# Patient Record
Sex: MALE | Race: WHITE | Employment: UNEMPLOYED | URBAN - METROPOLITAN AREA
[De-identification: names, ages, dates, MRNs, and addresses within clinical notes are randomized per-mention and may not be internally consistent; named-entity substitution may affect disease eponyms.]

---

## 2017-10-04 ENCOUNTER — HISTORICAL (OUTPATIENT)
Dept: LAB | Facility: HOSPITAL | Age: 82
End: 2017-10-04

## 2017-10-04 LAB
BUN SERPL-MCNC: 19.7 MG/DL (ref 7–18)
CALCIUM SERPL-MCNC: 9.7 MG/DL (ref 8.5–10.1)
CHLORIDE SERPL-SCNC: 104 MMOL/L (ref 98–107)
CO2 SERPL-SCNC: 34.7 MMOL/L (ref 21–32)
CREAT SERPL-MCNC: 1 MG/DL (ref 0.6–1.3)
EST. AVERAGE GLUCOSE BLD GHB EST-MCNC: 134 MG/DL
GLUCOSE SERPL-MCNC: 96 MG/DL (ref 74–106)
HBA1C MFR BLD: 6.3 % (ref 4.5–6.2)
POTASSIUM SERPL-SCNC: 6.3 MMOL/L (ref 3.5–5.1)
SODIUM SERPL-SCNC: 142 MMOL/L (ref 136–145)

## 2017-10-06 ENCOUNTER — HISTORICAL (OUTPATIENT)
Dept: LAB | Facility: HOSPITAL | Age: 82
End: 2017-10-06

## 2017-10-06 LAB — POTASSIUM SERPL-SCNC: 4.6 MMOL/L (ref 3.5–5.1)

## 2018-04-26 ENCOUNTER — HISTORICAL (OUTPATIENT)
Dept: RADIOLOGY | Facility: HOSPITAL | Age: 83
End: 2018-04-26

## 2018-06-13 DIAGNOSIS — R06.02 SOB (SHORTNESS OF BREATH): Primary | ICD-10-CM

## 2018-09-13 ENCOUNTER — CLINICAL SUPPORT (OUTPATIENT)
Dept: PULMONOLOGY | Facility: CLINIC | Age: 83
End: 2018-09-13

## 2018-09-13 ENCOUNTER — HOSPITAL ENCOUNTER (OUTPATIENT)
Dept: RADIOLOGY | Facility: HOSPITAL | Age: 83
Discharge: HOME | End: 2018-09-13
Attending: INTERNAL MEDICINE

## 2018-09-13 ENCOUNTER — PROCEDURE VISIT (OUTPATIENT)
Dept: PULMONOLOGY | Facility: CLINIC | Age: 83
End: 2018-09-13

## 2018-09-13 VITALS
OXYGEN SATURATION: 97 % | RESPIRATION RATE: 16 BRPM | BODY MASS INDEX: 26.04 KG/M2 | WEIGHT: 186 LBS | HEIGHT: 71 IN | DIASTOLIC BLOOD PRESSURE: 76 MMHG | HEART RATE: 78 BPM | SYSTOLIC BLOOD PRESSURE: 124 MMHG

## 2018-09-13 DIAGNOSIS — R06.02 SOB (SHORTNESS OF BREATH): ICD-10-CM

## 2018-09-13 DIAGNOSIS — J96.11 CHRONIC RESPIRATORY FAILURE WITH HYPOXIA: ICD-10-CM

## 2018-09-13 DIAGNOSIS — J61 PULMONARY ASBESTOSIS: ICD-10-CM

## 2018-09-13 DIAGNOSIS — J43.1 PANLOBULAR EMPHYSEMA: Primary | ICD-10-CM

## 2018-09-13 PROBLEM — I71.40 ABDOMINAL AORTIC ANEURYSM (AAA): Status: ACTIVE | Noted: 2018-09-13

## 2018-09-13 PROBLEM — E78.2 MULTIPLE-TYPE HYPERLIPIDEMIA: Status: ACTIVE | Noted: 2018-09-13

## 2018-09-13 PROBLEM — E11.9 TYPE 2 DIABETES MELLITUS: Status: ACTIVE | Noted: 2018-09-13

## 2018-09-13 PROBLEM — I10 HYPERTENSION: Status: ACTIVE | Noted: 2018-09-13

## 2018-09-13 PROBLEM — J44.9 CHRONIC OBSTRUCTIVE PULMONARY DISEASE: Status: ACTIVE | Noted: 2018-09-13

## 2018-09-13 LAB
BRPFT: ABNORMAL
DLCO ADJ PRE: 9.67 ML/(MIN*MMHG) (ref 18.29–32.14)
DLCO PRE: 9.67 ML/(MIN*MMHG) (ref 18.29–32.14)
DLCO SINGLE BREATH LLN: 18.29
DLCO SINGLE BREATH PRE REF: 38.3 %
DLCO SINGLE BREATH REF: 25.22
DLCOC SBVA LLN: 2.38
DLCOC SBVA PRE REF: 75.9 %
DLCOC SBVA REF: 3.49
DLCOC SINGLE BREATH LLN: 18.29
DLCOC SINGLE BREATH PRE REF: 38.3 %
DLCOC SINGLE BREATH REF: 25.22
DLCOVA LLN: 2.38
DLCOVA PRE REF: 75.9 %
DLCOVA PRE: 2.65 ML/(MIN*MMHG*L) (ref 2.38–4.6)
DLCOVA REF: 3.49
DLVAADJ PRE: 2.65 ML/(MIN*MMHG*L) (ref 2.38–4.6)
ERV LLN: 0.92
ERV PRE REF: 122 %
ERV PRE: 1.12 L (ref 0.92–0.92)
ERV REF: 0.92
ERVN2 LLN: 0.92
ERVN2 REF: 0.92
FEF 25 75 CHG: 2 %
FEF 25 75 LLN: 0.72
FEF 25 75 POST REF: 18.1 %
FEF 25 75 POST: 0.36 L/S (ref 0.72–3.21)
FEF 25 75 PRE REF: 17.7 %
FEF 25 75 PRE: 0.35 L/S (ref 0.72–3.21)
FEF 25 75 REF: 1.97
FET100 CHG: 12.9 %
FET100 POST: 19 SEC
FET100 PRE: 16.82 SEC
FEV1 CHG: 3.7 %
FEV1 FVC CHG: -1.6 %
FEV1 FVC LLN: 59
FEV1 FVC POST REF: 62 %
FEV1 FVC POST: 46.08 % (ref 59.22–89.35)
FEV1 FVC PRE REF: 63 %
FEV1 FVC PRE: 46.83 % (ref 59.22–89.35)
FEV1 FVC REF: 74
FEV1 LLN: 1.96
FEV1 POST REF: 46.9 %
FEV1 POST: 1.33 L (ref 1.96–3.74)
FEV1 PRE REF: 45.2 %
FEV1 PRE: 1.29 L (ref 1.96–3.74)
FEV1 REF: 2.85
FEV6 CHG: 3.5 %
FEV6 LLN: 2.83
FEV6 POST REF: 62.4 %
FEV6 POST: 2.34 L (ref 2.83–4.67)
FEV6 PRE REF: 60.2 %
FEV6 PRE: 2.26 L (ref 2.83–4.67)
FEV6 REF: 3.75
FRCN2 LLN: 2.85
FRCN2 REF: 3.84
FRCPL PRE: 3.5 L
FRCPLETH LLN: 2.85
FRCPLETH PREREF: 91.2 %
FRCPLETH REF: 3.84
FVC CHG: 5.4 %
FVC LLN: 2.78
FVC POST REF: 74.7 %
FVC POST: 2.9 L (ref 2.78–4.98)
FVC PRE REF: 70.9 %
FVC PRE: 2.75 L (ref 2.78–4.98)
FVC REF: 3.88
IVC PRE: 2.01 L (ref 2.78–4.98)
IVC SINGLE BREATH LLN: 2.78
IVC SINGLE BREATH PRE REF: 51.9 %
IVC SINGLE BREATH REF: 3.88
MVV LLN: 96
MVV PRE REF: 37.1 %
MVV PRE: 42 L/MIN (ref 96.19–130.13)
MVV REF: 113
PEF CHG: -18.8 %
PEF LLN: 4.74
PEF POST REF: 44 %
PEF POST: 3.12 L/S (ref 4.74–9.43)
PEF PRE REF: 54.2 %
PEF PRE: 3.84 L/S (ref 4.74–9.43)
PEF REF: 7.09
RAW LLN: 3.06
RAW PRE REF: 388 %
RAW PRE: 11.87 CMH2O*S/L (ref 3.06–3.06)
RAW REF: 3.06
RV LLN: 2.24
RV PRE REF: 71.3 %
RV PRE: 2.08 L (ref 2.24–3.59)
RV REF: 2.92
RVN2 LLN: 2.24
RVN2 REF: 2.92
RVN2TLCN2 LLN: 37
RVN2TLCN2 REF: 46
RVTLC LLN: 37
RVTLC PRE REF: 93.8 %
RVTLC PRE: 43.09 % (ref 36.96–54.92)
RVTLC REF: 46
TLC LLN: 6.07
TLC PRE REF: 66.9 %
TLC PRE: 4.83 L (ref 6.07–8.37)
TLC REF: 7.22
TLCN2 LLN: 6.07
TLCN2 REF: 7.22
VA PRE: 3.64 L (ref 7.07–7.07)
VA SINGLE BREATH LLN: 7.07
VA SINGLE BREATH PRE REF: 51.5 %
VA SINGLE BREATH REF: 7.07
VC LLN: 2.78
VC PRE REF: 70.9 %
VC PRE: 2.75 L (ref 2.78–4.98)
VC REF: 3.88
VCMAXN2 LLN: 2.78
VCMAXN2 REF: 3.88
VTGRAWPRE: 3.54 L

## 2018-09-13 PROCEDURE — 99199 UNLISTED SPECIAL SVC PX/RPRT: CPT | Mod: ,,, | Performed by: INTERNAL MEDICINE

## 2018-09-13 PROCEDURE — 71048 X-RAY EXAM CHEST 4+ VIEWS: CPT | Mod: 26,,, | Performed by: RADIOLOGY

## 2018-09-13 PROCEDURE — 94060 EVALUATION OF WHEEZING: CPT | Mod: ,,, | Performed by: INTERNAL MEDICINE

## 2018-09-13 PROCEDURE — 94726 PLETHYSMOGRAPHY LUNG VOLUMES: CPT | Mod: ,,, | Performed by: INTERNAL MEDICINE

## 2018-09-13 PROCEDURE — 71048 X-RAY EXAM CHEST 4+ VIEWS: CPT | Mod: TC,PO

## 2018-09-13 PROCEDURE — 99455 WORK RELATED DISABILITY EXAM: CPT | Mod: 25,,, | Performed by: INTERNAL MEDICINE

## 2018-09-13 PROCEDURE — 94729 DIFFUSING CAPACITY: CPT | Mod: ,,, | Performed by: INTERNAL MEDICINE

## 2018-09-13 RX ORDER — ATORVASTATIN CALCIUM 80 MG/1
TABLET, FILM COATED ORAL
COMMUNITY
Start: 2018-07-16

## 2018-09-13 RX ORDER — METFORMIN HYDROCHLORIDE 500 MG/1
TABLET ORAL
Status: ON HOLD | COMMUNITY
Start: 2018-07-16 | End: 2023-03-14 | Stop reason: HOSPADM

## 2018-09-13 RX ORDER — CARVEDILOL 3.12 MG/1
TABLET ORAL
COMMUNITY
Start: 2018-07-16

## 2018-09-13 RX ORDER — AMLODIPINE BESYLATE 5 MG/1
TABLET ORAL
COMMUNITY
Start: 2018-07-16

## 2018-09-13 RX ORDER — PANTOPRAZOLE SODIUM 40 MG/1
TABLET, DELAYED RELEASE ORAL
Status: ON HOLD | COMMUNITY
Start: 2018-08-20 | End: 2023-03-14 | Stop reason: HOSPADM

## 2018-09-13 RX ORDER — PANTOPRAZOLE SODIUM 40 MG/1
20 TABLET, DELAYED RELEASE ORAL DAILY
COMMUNITY
Start: 2018-07-16

## 2018-09-13 NOTE — PROGRESS NOTES
HISTORY OF PRESENT ILLNESS:  This is an 83-year-old gentleman who was referred   to my office for further evaluation of asbestos exposure.    He has complaints of shortness of breath and dyspnea, which has been going on   for a number of years.  He indicates that this has become worse over the past   two to three years and he has required oxygen on a regular basis.  He has a   history of numerous hospitalizations for respiratory failure, bronchitis and   pneumonia.  The patient's daughter states that he has been in the hospital   during the fall, three times in the past three years.  Presently, he has   complaints of occasional cough.  He has no active sputum production today.  He   has chronic shortness of breath when performing daily activities.  He has no   complaints of chest pain.  He has a significant history of cigarette smoking in   the past, having smoked half to one pack of cigarettes a day from 1952 to .    Over this period of time, he has accumulated a 60+ pack year history of   cigarette smoking.  He denies any significant history of heart disease.  He has   been told he has emphysema.  He has no prior history of tuberculosis.    PAST MEDICAL HISTORY:  Prostate cancer.    FAMILY HISTORY:  His mother  of old age.  His father also had a prostate   cancer.    His occupational history was reviewed.  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 He worked on boilers and engine rooms as well as   machines on deck - dredging equipment and friction drag lines.  He served as a helper to mechanics, as a  and later as a captain.   He retired in .    Over the many years of employment, he worked in the areas where asbestos was   present.  He worked on engines that contained asbestos dust.  He  worked in areas   were asbestos friction lines were present.  He reports he worked on a ship   called the [place] dredger, which had a significant amount of asbestos.  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 He states that the engine room and friction lines   on the deck that contained asbestos and during repairs asbestos fibers were generated.   He worked on this vessel for a number of years.  He reports significant exposure to asbestos   dust and fibers while working inside the holds, performing repairs on the deck and in engine rooms of ships.    He reports no history of sandblasting or welding.  Overall he has a significant history of asbestos exposure in the past.    PHYSICAL EXAMINATION:  GENERAL:  Pleasant, chronically ill-appearing gentleman who is in a wheelchair.    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  VITAL SIGNS:  His height is 70-1/2 inches, his weight is 186 pounds.  His pulse   is 60 and regular, respiratory rate is 18, his oxygen saturation is 97% on 2   liters of oxygen, his blood pressure is 124/76.  HEENT:  His left conjunctiva is injected.  He is wearing supplemental oxygen.    Pupils are equal and reactive to light.  Oral cavity is grossly normal.  NECK:  No lymphadenopathy.  CHEST:  Decreased breath sounds.  Scattered rales are heard throughout both lung   fields.  Wheezing is heard anteriorly.  HEART:  Distant heart sounds.  Regular rhythm.  ABDOMEN:  Soft.  Liver and spleen not enlarged.  EXTREMITIES:  No cyanosis or clubbing.  There is trace edema present.  The right   arm has decreased range of motion due to his shoulder injury.    REVIEW OF CHEST RADIOGRAPHS:  The patient's chest x-ray from the [facility] dated   09/13/2018 is personally reviewed.  This film reveals bilateral coarsening of interstitial   markings in the mid and lower lung fields. There is a small pleural plaque seen in profile on the mid left lateral chest wall.  An area of atelectasis noted  on the left mid chest.  These are bilateral apical pleural thickening, slightly   greater on the right.  Emphysematous changes are noted.    Radiologist Report:    Radiology Result      Name:   :  Patient MRN:   Prasanth Garcia 1935 [id number]   Account Number: Room & Bed Accession Number:   [id number]   [id number]   Authorizing Physician: Patient Class: Diagnosis:   Ryan Newton OP- Outpatient Diagnostic Testing SOB (shortness of breath) [R06.02 (ICD-10-CM)]   Procedure:  Exam Date: Reason for Exam:   X-Ray Chest 4 Or More View 2018 None Specified             RESULTS:     EXAMINATION:  XR CHEST 4 OR MORE VIEW     CLINICAL HISTORY:  Shortness of breath     COMPARISON:  None     FINDINGS:  COPD type changes noted with mild coarsening of the interstitial markings.    Linear areas of scarring and/or subsegmental atelectasis noted within the   left mid and bilateral lower lung fields.  Heart size within normal limits   and pulmonary vasculature normal allowing for rotation.  No effusions.  No   consolidation.  Osteopenia and spondylosis.  Bilateral apical pleural   thickening, greater on the right.  Prominent degenerative change noted the   AC joints and there is extensive degenerative change at the right   glenohumeral joint.     IMPRESSION:      Mild COPD changes without focal consolidation.     Scattered areas of linear scarring and/or subsegmental atelectasis   bilaterally.     Additional findings as above.        Electronically signed by:     James Mccall MD  Date:                                    2018  Time:                                   16:05                    Signed By:  James Mccall III, MD on 2018  4:05 PM          REVIEW OF PULMONARY FUNCTION STUDIES:  Pulmonary function studies are reviewed and demonstrate a reduced total lung   capacity of 4.83%, 66.9% of predicted.  His FEV1 is reduced at 1.29 liters, 45%   of predicted.  There is no improvement in airflow with  bronchodilatation.  His   diffusion capacity is severely reduced at 38.3 mL/min/mmHg.    Overall, this is a mixed obstructive and restrictive defect.  His diffusion   capacity is severely reduced.    REVIEW OF MEDICAL RECORDS:  Old medical records are reviewed.  A chest x-ray report from 07/19/2017 by Dr. Kenji Malik describes prominent interstitial markings throughout.    A report of a CT scan of the chest is reviewed, dated 05/15/2017 which   demonstrated an area of subpleural consolidation in the left lung base, changes   of emphysema and bronchial wall thickening in the lower lungs, nonspecific solid   5 to 6-mm pulmonary nodules were also reported.    ASSESSMENT:  It is my opinion Mr. Prasanth Garcia has pulmonary asbestosis as   manifested by radiographic evidence of interstitial fibrosis, rales on physical   examination, restrictive defect in his lung function and a significant history   of prior asbestos exposure.    He has severe impairment of his lung function and is oxygen dependent.    Additionally, he has chronic obstructive pulmonary disease and is on a regular   treatment program by a pulmonologist in the [facility].    He remains at increased risk for development of lung cancer, mesothelioma as   well as other malignancies due to his prior asbestos exposure.  He should obtain   yearly chest x-rays and pulmonary function studies to monitor his   asbestos-related disease.    Thank you for allowing me to evaluate this individual.        Ryan Newton MD                                          Patient Active Problem List   Diagnosis    Chronic obstructive pulmonary disease    Abdominal aortic aneurysm (AAA)    Hypertension    Multiple-type hyperlipidemia    Type 2 diabetes mellitus    Pulmonary asbestosis    Chronic respiratory failure with hypoxia

## 2018-09-13 NOTE — LETTER
September 14, 2018      [employer]  [address]  [address]  [place] [address]         [facility]  [address]  [place] [address]  Phone: [phone]  Fax: [phone]   Patient: Prasanth Garcia   MR Number: [id number]   YOB: 1935   Date of Visit: 9/13/2018       Dear Mr. Russellciau    Thank you for referring Prasanth Garcia to me for evaluation. Attached you will find relevant portions of my assessment and plan of care.    If you have questions, please do not hesitate to call me.    Sincerely,          Ryan Newton MD      CC  No Recipients    Enclosure

## 2018-09-14 PROBLEM — J61 PULMONARY ASBESTOSIS: Status: ACTIVE | Noted: 2018-09-14

## 2018-09-14 PROBLEM — J96.11 CHRONIC RESPIRATORY FAILURE WITH HYPOXIA: Status: ACTIVE | Noted: 2018-09-14

## 2018-10-17 ENCOUNTER — HOSPITAL ENCOUNTER (OUTPATIENT)
Dept: MEDSURG UNIT | Facility: HOSPITAL | Age: 83
End: 2018-10-19
Attending: HOSPITALIST | Admitting: INTERNAL MEDICINE

## 2018-10-17 LAB
ABS NEUT (OLG): 4.61 X10(3)/MCL (ref 2.1–9.2)
ALBUMIN SERPL-MCNC: 2.1 GM/DL (ref 3.4–5)
ALBUMIN SERPL-MCNC: 3 GM/DL (ref 3.4–5)
ALBUMIN/GLOB SERPL: 0.9 {RATIO}
ALBUMIN/GLOB SERPL: 0.9 {RATIO}
ALP SERPL-CCNC: 55 UNIT/L (ref 50–136)
ALP SERPL-CCNC: 80 UNIT/L (ref 50–136)
ALT SERPL-CCNC: 12 UNIT/L (ref 12–78)
ALT SERPL-CCNC: 14 UNIT/L (ref 12–78)
AST SERPL-CCNC: 13 UNIT/L (ref 15–37)
AST SERPL-CCNC: 19 UNIT/L (ref 15–37)
BASOPHILS # BLD AUTO: 0 X10(3)/MCL (ref 0–0.2)
BASOPHILS NFR BLD AUTO: 0 %
BILIRUB SERPL-MCNC: 0.4 MG/DL (ref 0.2–1)
BILIRUB SERPL-MCNC: 0.4 MG/DL (ref 0.2–1)
BILIRUBIN DIRECT+TOT PNL SERPL-MCNC: 0.1 MG/DL (ref 0–0.2)
BILIRUBIN DIRECT+TOT PNL SERPL-MCNC: 0.2 MG/DL (ref 0–0.2)
BILIRUBIN DIRECT+TOT PNL SERPL-MCNC: 0.2 MG/DL (ref 0–0.8)
BILIRUBIN DIRECT+TOT PNL SERPL-MCNC: 0.3 MG/DL (ref 0–0.8)
BUN SERPL-MCNC: 11 MG/DL (ref 7–18)
BUN SERPL-MCNC: 14 MG/DL (ref 7–18)
CALCIUM SERPL-MCNC: 6.1 MG/DL (ref 8.5–10.1)
CALCIUM SERPL-MCNC: 8.3 MG/DL (ref 8.5–10.1)
CHLORIDE SERPL-SCNC: 107 MMOL/L (ref 98–107)
CHLORIDE SERPL-SCNC: 118 MMOL/L (ref 98–107)
CO2 SERPL-SCNC: 25 MMOL/L (ref 21–32)
CO2 SERPL-SCNC: 31 MMOL/L (ref 21–32)
COLOR STL: NORMAL
CONSISTENCY STL: NORMAL
CREAT SERPL-MCNC: 0.77 MG/DL (ref 0.7–1.3)
CREAT SERPL-MCNC: 1.21 MG/DL (ref 0.7–1.3)
EOSINOPHIL # BLD AUTO: 0.2 X10(3)/MCL (ref 0–0.9)
EOSINOPHIL NFR BLD AUTO: 4 %
ERYTHROCYTE [DISTWIDTH] IN BLOOD BY AUTOMATED COUNT: 12.7 % (ref 11.5–17)
GLOBULIN SER-MCNC: 2.4 GM/DL (ref 2.4–3.5)
GLOBULIN SER-MCNC: 3.4 GM/DL (ref 2.4–3.5)
GLUCOSE SERPL-MCNC: 102 MG/DL (ref 74–106)
GLUCOSE SERPL-MCNC: 103 MG/DL (ref 74–106)
HCT VFR BLD AUTO: 28 % (ref 42–52)
HGB BLD-MCNC: 8.5 GM/DL (ref 14–18)
LACTATE SERPL-SCNC: 2 MMOL/L (ref 0.4–2)
LACTATE SERPL-SCNC: 2.3 MMOL/L (ref 0.4–2)
LYMPHOCYTES # BLD AUTO: 1.5 X10(3)/MCL (ref 0.6–4.6)
LYMPHOCYTES NFR BLD AUTO: 21 %
MCH RBC QN AUTO: 30.2 PG (ref 27–31)
MCHC RBC AUTO-ENTMCNC: 30.4 GM/DL (ref 33–36)
MCV RBC AUTO: 99.6 FL (ref 80–94)
MONOCYTES # BLD AUTO: 0.6 X10(3)/MCL (ref 0.1–1.3)
MONOCYTES NFR BLD AUTO: 8 %
NEUTROPHILS # BLD AUTO: 4.61 X10(3)/MCL (ref 2.1–9.2)
NEUTROPHILS NFR BLD AUTO: 66 %
PLATELET # BLD AUTO: 150 X10(3)/MCL (ref 130–400)
PMV BLD AUTO: 11.4 FL (ref 9.4–12.4)
POTASSIUM SERPL-SCNC: 3.2 MMOL/L (ref 3.5–5.1)
POTASSIUM SERPL-SCNC: 4.3 MMOL/L (ref 3.5–5.1)
PROT SERPL-MCNC: 4.5 GM/DL (ref 6.4–8.2)
PROT SERPL-MCNC: 6.4 GM/DL (ref 6.4–8.2)
RBC # BLD AUTO: 2.81 X10(6)/MCL (ref 4.7–6.1)
SODIUM SERPL-SCNC: 143 MMOL/L (ref 136–145)
SODIUM SERPL-SCNC: 150 MMOL/L (ref 136–145)
WBC # SPEC AUTO: 7 X10(3)/MCL (ref 4.5–11.5)

## 2018-10-18 LAB
BUN SERPL-MCNC: 13 MG/DL (ref 7–18)
CALCIUM SERPL-MCNC: 8.7 MG/DL (ref 8.5–10.1)
CHLORIDE SERPL-SCNC: 107 MMOL/L (ref 98–107)
CO2 SERPL-SCNC: 32 MMOL/L (ref 21–32)
COLOR STL: NORMAL
CONSISTENCY STL: NORMAL
CREAT SERPL-MCNC: 1.15 MG/DL (ref 0.7–1.3)
CREAT/UREA NIT SERPL: 11.3
ERYTHROCYTE [DISTWIDTH] IN BLOOD BY AUTOMATED COUNT: 12.7 % (ref 11.5–17)
GLUCOSE SERPL-MCNC: 170 MG/DL (ref 74–106)
HCT VFR BLD AUTO: 33.1 % (ref 42–52)
HEMOCCULT SP2 STL QL: NEGATIVE
HGB BLD-MCNC: 10.2 GM/DL (ref 14–18)
MCH RBC QN AUTO: 30.3 PG (ref 27–31)
MCHC RBC AUTO-ENTMCNC: 30.8 GM/DL (ref 33–36)
MCV RBC AUTO: 98.2 FL (ref 80–94)
PLATELET # BLD AUTO: 195 X10(3)/MCL (ref 130–400)
PMV BLD AUTO: 11.3 FL (ref 9.4–12.4)
POTASSIUM SERPL-SCNC: 4.4 MMOL/L (ref 3.5–5.1)
RBC # BLD AUTO: 3.37 X10(6)/MCL (ref 4.7–6.1)
SODIUM SERPL-SCNC: 144 MMOL/L (ref 136–145)
WBC # SPEC AUTO: 6.4 X10(3)/MCL (ref 4.5–11.5)

## 2018-10-19 LAB — FINAL CULTURE: NORMAL

## 2018-10-23 LAB
FINAL CULTURE: NORMAL
FINAL CULTURE: NORMAL

## 2019-01-20 LAB — C DIFF INTERP: NEGATIVE

## 2019-01-21 ENCOUNTER — HISTORICAL (OUTPATIENT)
Dept: ADMINISTRATIVE | Facility: HOSPITAL | Age: 84
End: 2019-01-21

## 2019-01-21 LAB
ABS NEUT (OLG): 4.89 X10(3)/MCL (ref 2.1–9.2)
ALBUMIN SERPL-MCNC: 3.3 GM/DL (ref 3.4–5)
ALBUMIN/GLOB SERPL: 1.1 {RATIO}
ALP SERPL-CCNC: 83 UNIT/L (ref 50–136)
ALT SERPL-CCNC: 16 UNIT/L (ref 12–78)
AST SERPL-CCNC: 16 UNIT/L (ref 15–37)
BASOPHILS # BLD AUTO: 0 X10(3)/MCL (ref 0–0.2)
BASOPHILS NFR BLD AUTO: 0 %
BILIRUB SERPL-MCNC: 0.8 MG/DL (ref 0.2–1)
BILIRUBIN DIRECT+TOT PNL SERPL-MCNC: 0.2 MG/DL (ref 0–0.2)
BILIRUBIN DIRECT+TOT PNL SERPL-MCNC: 0.6 MG/DL (ref 0–0.8)
BUN SERPL-MCNC: 14 MG/DL (ref 7–18)
CALCIUM SERPL-MCNC: 8.7 MG/DL (ref 8.5–10.1)
CHLORIDE SERPL-SCNC: 102 MMOL/L (ref 98–107)
CO2 SERPL-SCNC: 33 MMOL/L (ref 21–32)
CREAT SERPL-MCNC: 0.98 MG/DL (ref 0.7–1.3)
EOSINOPHIL # BLD AUTO: 0.4 X10(3)/MCL (ref 0–0.9)
EOSINOPHIL NFR BLD AUTO: 6 %
ERYTHROCYTE [DISTWIDTH] IN BLOOD BY AUTOMATED COUNT: 13.4 % (ref 11.5–17)
GLOBULIN SER-MCNC: 3.1 GM/DL (ref 2.4–3.5)
GLUCOSE SERPL-MCNC: 104 MG/DL (ref 74–106)
HCT VFR BLD AUTO: 35.6 % (ref 42–52)
HGB BLD-MCNC: 10.7 GM/DL (ref 14–18)
LYMPHOCYTES # BLD AUTO: 2.1 X10(3)/MCL (ref 0.6–4.6)
LYMPHOCYTES NFR BLD AUTO: 26 %
MCH RBC QN AUTO: 29.9 PG (ref 27–31)
MCHC RBC AUTO-ENTMCNC: 30.1 GM/DL (ref 33–36)
MCV RBC AUTO: 99.4 FL (ref 80–94)
MONOCYTES # BLD AUTO: 0.6 X10(3)/MCL (ref 0.1–1.3)
MONOCYTES NFR BLD AUTO: 8 %
NEUTROPHILS # BLD AUTO: 4.89 X10(3)/MCL (ref 2.1–9.2)
NEUTROPHILS NFR BLD AUTO: 60 %
PLATELET # BLD AUTO: 229 X10(3)/MCL (ref 130–400)
PMV BLD AUTO: 11.7 FL (ref 9.4–12.4)
POTASSIUM SERPL-SCNC: 4.5 MMOL/L (ref 3.5–5.1)
PROT SERPL-MCNC: 6.4 GM/DL (ref 6.4–8.2)
RBC # BLD AUTO: 3.58 X10(6)/MCL (ref 4.7–6.1)
SODIUM SERPL-SCNC: 143 MMOL/L (ref 136–145)
TSH SERPL-ACNC: 2.32 MIU/L (ref 0.36–3.74)
WBC # SPEC AUTO: 8.2 X10(3)/MCL (ref 4.5–11.5)

## 2019-01-24 LAB — FINAL CULTURE: NORMAL

## 2019-02-05 ENCOUNTER — HISTORICAL (OUTPATIENT)
Dept: LAB | Facility: HOSPITAL | Age: 84
End: 2019-02-05

## 2019-02-05 LAB
APPEARANCE, UA: CLEAR
BACTERIA SPEC CULT: ABNORMAL
BILIRUB UR QL STRIP: ABNORMAL
COLOR UR: YELLOW
CREAT UR-MCNC: 384 MG/DL (ref 30–125)
EST. AVERAGE GLUCOSE BLD GHB EST-MCNC: 123 MG/DL
GLUCOSE (UA): NEGATIVE
HBA1C MFR BLD: 5.9 % (ref 4.5–6.2)
HGB UR QL STRIP: NEGATIVE
HYALINE CASTS #/AREA URNS LPF: ABNORMAL /[LPF]
KETONES UR QL STRIP: ABNORMAL
LEUKOCYTE ESTERASE UR QL STRIP: NEGATIVE
MICROALBUMIN UR-MCNC: >10 MG/DL (ref 0–3)
MICROALBUMIN/CREAT RATIO PNL UR: >26 MG/GM CR (ref 0–30)
MUCOUS THREADS URNS QL MICRO: ABNORMAL
NITRITE UR QL STRIP: NEGATIVE
PH UR STRIP: 5 [PH] (ref 5–9)
PROT UR QL STRIP: 100
RBC #/AREA URNS HPF: ABNORMAL /HPF
SP GR UR STRIP: 1.02 (ref 1–1.03)
SQUAMOUS EPITHELIAL, UA: ABNORMAL
UROBILINOGEN UR STRIP-ACNC: 0.2
WBC #/AREA URNS HPF: ABNORMAL /HPF

## 2019-02-08 LAB — FINAL CULTURE: NO GROWTH

## 2019-05-09 ENCOUNTER — HISTORICAL (OUTPATIENT)
Dept: ADMINISTRATIVE | Facility: HOSPITAL | Age: 84
End: 2019-05-09

## 2019-05-09 LAB
ALBUMIN SERPL-MCNC: 3.9 G/DL (ref 3.5–4.7)
ALBUMIN/GLOB SERPL: 1.8 {RATIO} (ref 1.2–2.2)
ALP SERPL-CCNC: 97 IU/L (ref 39–117)
ALT SERPL-CCNC: 17 IU/L (ref 0–44)
AST SERPL-CCNC: 14 IU/L (ref 0–40)
BASOPHILS # BLD AUTO: 0 X10E3/UL (ref 0–0.2)
BASOPHILS NFR BLD AUTO: 0 %
BILIRUB SERPL-MCNC: 0.3 MG/DL (ref 0–1.2)
BUN SERPL-MCNC: 23 MG/DL (ref 8–27)
CALCIUM SERPL-MCNC: 9.5 MG/DL (ref 8.6–10.2)
CHLORIDE SERPL-SCNC: 105 MMOL/L (ref 96–106)
CO2 SERPL-SCNC: 29 MMOL/L (ref 20–29)
CREAT SERPL-MCNC: 0.85 MG/DL (ref 0.76–1.27)
CREAT/UREA NIT SERPL: 27 (ref 10–24)
EOSINOPHIL # BLD AUTO: 0.2 X10E3/UL (ref 0–0.4)
EOSINOPHIL NFR BLD AUTO: 3 %
ERYTHROCYTE [DISTWIDTH] IN BLOOD BY AUTOMATED COUNT: 13.7 % (ref 12.3–15.4)
GLOBULIN SER-MCNC: 2.2 G/DL (ref 1.5–4.5)
GLUCOSE SERPL-MCNC: 98 MG/DL (ref 65–99)
HBA1C MFR BLD: 6.3 % (ref 4.8–5.6)
HCT VFR BLD AUTO: 38.1 % (ref 37.5–51)
HGB BLD-MCNC: 11.8 G/DL (ref 13–17.7)
LYMPHOCYTES # BLD AUTO: 2.1 X10E3/UL (ref 0.7–3.1)
LYMPHOCYTES NFR BLD AUTO: 23 %
MCH RBC QN AUTO: 30.4 PG (ref 26.6–33)
MCHC RBC AUTO-ENTMCNC: 31 G/DL (ref 31.5–35.7)
MCV RBC AUTO: 98 FL (ref 79–97)
MONOCYTES # BLD AUTO: 0.8 X10E3/UL (ref 0.1–0.9)
MONOCYTES NFR BLD AUTO: 9 %
NEUTROPHILS # BLD AUTO: 5.9 X10E3/UL (ref 1.4–7)
NEUTROPHILS NFR BLD AUTO: 65 %
PLATELET # BLD AUTO: 225 X10E3/UL (ref 150–379)
POTASSIUM SERPL-SCNC: 4.8 MMOL/L (ref 3.5–5.2)
PROT SERPL-MCNC: 6.1 G/DL (ref 6–8.5)
RBC # BLD AUTO: 3.88 X10(6)/MCL (ref 4.14–5.8)
SODIUM SERPL-SCNC: 148 MMOL/L (ref 134–144)
WBC # SPEC AUTO: 9.1 X10E3/UL (ref 3.4–10.8)

## 2019-10-14 ENCOUNTER — HISTORICAL (OUTPATIENT)
Dept: RESPIRATORY THERAPY | Facility: HOSPITAL | Age: 84
End: 2019-10-14

## 2020-06-02 ENCOUNTER — HISTORICAL (OUTPATIENT)
Dept: ADMINISTRATIVE | Facility: HOSPITAL | Age: 85
End: 2020-06-02

## 2020-08-12 ENCOUNTER — HISTORICAL (OUTPATIENT)
Dept: ADMINISTRATIVE | Facility: HOSPITAL | Age: 85
End: 2020-08-12

## 2020-08-12 LAB — PREALB SERPL-MCNC: 15 MG/DL (ref 16–42)

## 2020-08-25 ENCOUNTER — HISTORICAL (OUTPATIENT)
Dept: ADMINISTRATIVE | Facility: HOSPITAL | Age: 85
End: 2020-08-25

## 2020-08-25 LAB
ABS NEUT (OLG): 2.11 X10(3)/MCL (ref 2.1–9.2)
ALBUMIN SERPL-MCNC: 3.4 GM/DL (ref 3.4–4.8)
ALBUMIN/GLOB SERPL: 1.3 RATIO (ref 1.1–2)
ALP SERPL-CCNC: 89 UNIT/L (ref 40–150)
ALT SERPL-CCNC: 18 UNIT/L (ref 0–55)
APPEARANCE, UA: ABNORMAL
AST SERPL-CCNC: 29 UNIT/L (ref 5–34)
BACTERIA SPEC CULT: ABNORMAL /HPF
BILIRUB SERPL-MCNC: 0.7 MG/DL
BILIRUB UR QL STRIP: NEGATIVE
BILIRUBIN DIRECT+TOT PNL SERPL-MCNC: 0.3 MG/DL (ref 0–0.5)
BILIRUBIN DIRECT+TOT PNL SERPL-MCNC: 0.4 MG/DL (ref 0–0.8)
BUN SERPL-MCNC: 24.7 MG/DL (ref 8.4–25.7)
CALCIUM SERPL-MCNC: 8.2 MG/DL (ref 8.8–10)
CHLORIDE SERPL-SCNC: 98 MMOL/L (ref 98–107)
CO2 SERPL-SCNC: 30 MMOL/L (ref 23–31)
COLOR UR: YELLOW
CREAT SERPL-MCNC: 1.22 MG/DL (ref 0.73–1.18)
EOSINOPHIL # BLD AUTO: 0 X10(3)/MCL (ref 0–0.9)
EOSINOPHIL NFR BLD AUTO: 1 %
ERYTHROCYTE [DISTWIDTH] IN BLOOD BY AUTOMATED COUNT: 16.8 % (ref 11.5–17)
GLOBULIN SER-MCNC: 2.6 GM/DL (ref 2.4–3.5)
GLUCOSE (UA): NEGATIVE
GLUCOSE SERPL-MCNC: 144 MG/DL (ref 82–115)
HCT VFR BLD AUTO: 35.8 % (ref 42–52)
HGB BLD-MCNC: 10.6 GM/DL (ref 14–18)
HGB UR QL STRIP: ABNORMAL
KETONES UR QL STRIP: NEGATIVE
LEUKOCYTE ESTERASE UR QL STRIP: ABNORMAL
LYMPHOCYTES # BLD AUTO: 1.3 X10(3)/MCL (ref 0.6–4.6)
LYMPHOCYTES NFR BLD AUTO: 32 %
MCH RBC QN AUTO: 27.4 PG (ref 27–31)
MCHC RBC AUTO-ENTMCNC: 29.6 GM/DL (ref 33–36)
MCV RBC AUTO: 92.5 FL (ref 80–94)
MONOCYTES # BLD AUTO: 0.5 X10(3)/MCL (ref 0.1–1.3)
MONOCYTES NFR BLD AUTO: 14 %
NEUTROPHILS # BLD AUTO: 2.11 X10(3)/MCL (ref 2.1–9.2)
NEUTROPHILS NFR BLD AUTO: 53 %
NITRITE UR QL STRIP: NEGATIVE
PH UR STRIP: 6.5 [PH] (ref 5–9)
PLATELET # BLD AUTO: 182 X10(3)/MCL (ref 130–400)
PMV BLD AUTO: 11.2 FL (ref 9.4–12.4)
POTASSIUM SERPL-SCNC: 4.1 MMOL/L (ref 3.5–5.1)
PROT SERPL-MCNC: 6 GM/DL (ref 5.8–7.6)
PROT UR QL STRIP: NEGATIVE
RBC # BLD AUTO: 3.87 X10(6)/MCL (ref 4.7–6.1)
RBC #/AREA URNS HPF: 9 /HPF (ref 0–2)
SODIUM SERPL-SCNC: 140 MMOL/L (ref 136–145)
SP GR UR STRIP: 1.01 (ref 1–1.03)
SQUAMOUS EPITHELIAL, UA: ABNORMAL
UROBILINOGEN UR STRIP-ACNC: 0.2
WBC # SPEC AUTO: 4 X10(3)/MCL (ref 4.5–11.5)
WBC #/AREA URNS HPF: 24 /HPF (ref 0–3)

## 2020-09-21 ENCOUNTER — HISTORICAL (OUTPATIENT)
Dept: ADMINISTRATIVE | Facility: HOSPITAL | Age: 85
End: 2020-09-21

## 2020-09-21 LAB
CREAT UR-MCNC: 91.9 MG/DL (ref 58–161)
MICROALBUMIN UR-MCNC: 6.4 UG/ML
MICROALBUMIN/CREAT RATIO PNL UR: 7 MG/GM CR (ref 0–30)

## 2020-09-25 ENCOUNTER — HISTORICAL (OUTPATIENT)
Dept: ADMINISTRATIVE | Facility: HOSPITAL | Age: 85
End: 2020-09-25

## 2020-09-25 LAB
ABS NEUT (OLG): 3.52 X10(3)/MCL (ref 2.1–9.2)
ALBUMIN SERPL-MCNC: 3.3 GM/DL (ref 3.4–5)
ALBUMIN/GLOB SERPL: 1.4 RATIO (ref 1.1–2)
ALP SERPL-CCNC: 97 UNIT/L (ref 40–150)
ALT SERPL-CCNC: 13 UNIT/L (ref 0–55)
APPEARANCE, UA: CLEAR
AST SERPL-CCNC: 15 UNIT/L (ref 5–34)
BACTERIA #/AREA URNS AUTO: ABNORMAL /HPF
BASOPHILS # BLD AUTO: 0 X10(3)/MCL (ref 0–0.2)
BASOPHILS NFR BLD AUTO: 0 %
BILIRUB SERPL-MCNC: 0.4 MG/DL
BILIRUB UR QL STRIP: NEGATIVE
BILIRUBIN DIRECT+TOT PNL SERPL-MCNC: 0.2 MG/DL (ref 0–0.5)
BILIRUBIN DIRECT+TOT PNL SERPL-MCNC: 0.2 MG/DL (ref 0–0.8)
BUN SERPL-MCNC: 17.5 MG/DL (ref 8.4–25.7)
CALCIUM SERPL-MCNC: 8.5 MG/DL (ref 8.8–10)
CHLORIDE SERPL-SCNC: 104 MMOL/L (ref 98–107)
CHOLEST SERPL-MCNC: 102 MG/DL
CHOLEST/HDLC SERPL: 2 {RATIO} (ref 0–5)
CO2 SERPL-SCNC: 35 MMOL/L (ref 23–31)
COLOR UR: YELLOW
CREAT SERPL-MCNC: 1 MG/DL (ref 0.73–1.18)
EOSINOPHIL # BLD AUTO: 0.5 X10(3)/MCL (ref 0–0.9)
EOSINOPHIL NFR BLD AUTO: 8 %
ERYTHROCYTE [DISTWIDTH] IN BLOOD BY AUTOMATED COUNT: 15.2 % (ref 11.5–17)
EST. AVERAGE GLUCOSE BLD GHB EST-MCNC: 114 MG/DL
GLOBULIN SER-MCNC: 2.4 GM/DL (ref 2.4–3.5)
GLUCOSE (UA): NEGATIVE
GLUCOSE SERPL-MCNC: 100 MG/DL (ref 82–115)
HBA1C MFR BLD: 5.6 %
HCT VFR BLD AUTO: 30.3 % (ref 42–52)
HDLC SERPL-MCNC: 51 MG/DL (ref 35–60)
HGB BLD-MCNC: 8.6 GM/DL (ref 14–18)
HGB UR QL STRIP: NEGATIVE
KETONES UR QL STRIP: NEGATIVE
LDLC SERPL CALC-MCNC: 34 MG/DL (ref 50–140)
LEUKOCYTE ESTERASE UR QL STRIP: NEGATIVE
LYMPHOCYTES # BLD AUTO: 1.7 X10(3)/MCL (ref 0.6–4.6)
LYMPHOCYTES NFR BLD AUTO: 27 %
MAGNESIUM SERPL-MCNC: 2.3 MG/DL (ref 1.6–2.6)
MCH RBC QN AUTO: 26.9 PG (ref 27–31)
MCHC RBC AUTO-ENTMCNC: 28.4 GM/DL (ref 33–36)
MCV RBC AUTO: 94.7 FL (ref 80–94)
MONOCYTES # BLD AUTO: 0.6 X10(3)/MCL (ref 0.1–1.3)
MONOCYTES NFR BLD AUTO: 9 %
NEUTROPHILS # BLD AUTO: 3.52 X10(3)/MCL (ref 2.1–9.2)
NEUTROPHILS NFR BLD AUTO: 55 %
NITRITE UR QL STRIP.AUTO: POSITIVE
PH UR STRIP: 7.5 [PH] (ref 5–9)
PLATELET # BLD AUTO: 196 X10(3)/MCL (ref 130–400)
PMV BLD AUTO: 11.2 FL (ref 9.4–12.4)
POTASSIUM SERPL-SCNC: 5.8 MMOL/L (ref 3.5–5.1)
PREALB SERPL-MCNC: 19 MG/DL (ref 16–42)
PROT SERPL-MCNC: 5.7 GM/DL (ref 5.8–7.6)
PROT UR QL STRIP: NEGATIVE
RBC # BLD AUTO: 3.2 X10(6)/MCL (ref 4.7–6.1)
RBC #/AREA URNS HPF: ABNORMAL /[HPF]
SODIUM SERPL-SCNC: 143 MMOL/L (ref 136–145)
SP GR UR STRIP: 1.01 (ref 1–1.03)
SQUAMOUS EPITHELIAL, UA: ABNORMAL
TRIGL SERPL-MCNC: 84 MG/DL (ref 34–140)
UROBILINOGEN UR STRIP-ACNC: 0.2
VLDLC SERPL CALC-MCNC: 17 MG/DL
WBC # SPEC AUTO: 6.4 X10(3)/MCL (ref 4.5–11.5)
WBC #/AREA URNS AUTO: ABNORMAL /HPF (ref 0–3)

## 2020-09-29 ENCOUNTER — HISTORICAL (OUTPATIENT)
Dept: ADMINISTRATIVE | Facility: HOSPITAL | Age: 85
End: 2020-09-29

## 2020-09-29 LAB
BUN SERPL-MCNC: 22.1 MG/DL (ref 8.4–25.7)
BUN SERPL-MCNC: 24.4 MG/DL (ref 8.4–25.7)
CALCIUM SERPL-MCNC: 8.1 MG/DL (ref 8.8–10)
CALCIUM SERPL-MCNC: 8.3 MG/DL (ref 8.8–10)
CHLORIDE SERPL-SCNC: 105 MMOL/L (ref 98–107)
CHLORIDE SERPL-SCNC: 106 MMOL/L (ref 98–107)
CO2 SERPL-SCNC: 29 MMOL/L (ref 23–31)
CO2 SERPL-SCNC: 32 MMOL/L (ref 23–31)
CREAT SERPL-MCNC: 1 MG/DL (ref 0.73–1.18)
CREAT SERPL-MCNC: 1.22 MG/DL (ref 0.73–1.18)
CREAT/UREA NIT SERPL: 20
CREAT/UREA NIT SERPL: 22
FERRITIN SERPL-MCNC: 12.4 NG/ML (ref 21.81–274.66)
FERRITIN SERPL-MCNC: 7.83 NG/ML (ref 21.81–274.66)
FOLATE SERPL-MCNC: 18.8 NG/ML (ref 7–31.4)
GLUCOSE SERPL-MCNC: 122 MG/DL (ref 82–115)
GLUCOSE SERPL-MCNC: 134 MG/DL (ref 82–115)
IRON SATN MFR SERPL: 8 % (ref 20–50)
IRON SATN MFR SERPL: 9 % (ref 20–50)
IRON SERPL-MCNC: 25 UG/DL (ref 65–175)
IRON SERPL-MCNC: 28 UG/DL (ref 65–175)
POTASSIUM SERPL-SCNC: 4.7 MMOL/L (ref 3.5–5.1)
POTASSIUM SERPL-SCNC: 4.7 MMOL/L (ref 3.5–5.1)
SODIUM SERPL-SCNC: 141 MMOL/L (ref 136–145)
SODIUM SERPL-SCNC: 144 MMOL/L (ref 136–145)
TIBC SERPL-MCNC: 274 UG/DL (ref 69–240)
TIBC SERPL-MCNC: 300 UG/DL (ref 69–240)
TIBC SERPL-MCNC: 302 UG/DL (ref 250–450)
TIBC SERPL-MCNC: 325 UG/DL (ref 250–450)
TRANSFERRIN SERPL-MCNC: 272 MG/DL
TRANSFERRIN SERPL-MCNC: 283 MG/DL
VIT B12 SERPL-MCNC: 247 PG/ML (ref 213–816)
VIT B12 SERPL-MCNC: 275 PG/ML (ref 213–816)

## 2020-10-19 ENCOUNTER — HISTORICAL (OUTPATIENT)
Dept: ADMINISTRATIVE | Facility: HOSPITAL | Age: 85
End: 2020-10-19

## 2020-10-19 LAB
BUN SERPL-MCNC: 21.9 MG/DL (ref 8.4–25.7)
CALCIUM SERPL-MCNC: 8.9 MG/DL (ref 8.8–10)
CHLORIDE SERPL-SCNC: 102 MMOL/L (ref 98–107)
CO2 SERPL-SCNC: 30 MMOL/L (ref 23–31)
CREAT SERPL-MCNC: 0.99 MG/DL (ref 0.73–1.18)
CREAT/UREA NIT SERPL: 22
DEPRECATED CALCIDIOL+CALCIFEROL SERPL-MC: 21.8 NG/ML (ref 6.6–49.9)
FERRITIN SERPL-MCNC: 16.93 NG/ML (ref 21.81–274.66)
GLUCOSE SERPL-MCNC: 134 MG/DL (ref 82–115)
IRON SATN MFR SERPL: 8 % (ref 20–50)
IRON SERPL-MCNC: 25 UG/DL (ref 65–175)
MAGNESIUM SERPL-MCNC: 1.9 MG/DL (ref 1.6–2.6)
POTASSIUM SERPL-SCNC: 4.4 MMOL/L (ref 3.5–5.1)
SODIUM SERPL-SCNC: 142 MMOL/L (ref 136–145)
TIBC SERPL-MCNC: 299 UG/DL (ref 69–240)
TIBC SERPL-MCNC: 324 UG/DL (ref 250–450)
TRANSFERRIN SERPL-MCNC: 282 MG/DL

## 2020-10-20 ENCOUNTER — HISTORICAL (OUTPATIENT)
Dept: ADMINISTRATIVE | Facility: HOSPITAL | Age: 85
End: 2020-10-20

## 2020-10-20 LAB
FERRITIN SERPL-MCNC: 19.4 NG/ML (ref 21.81–274.66)
HCT VFR BLD AUTO: 33.1 % (ref 42–52)
HGB BLD-MCNC: 9.5 GM/DL (ref 14–18)
IRON SATN MFR SERPL: 9 % (ref 20–50)
IRON SERPL-MCNC: 30 UG/DL (ref 65–175)
PREALB SERPL-MCNC: 18.4 MG/DL (ref 16–42)
TIBC SERPL-MCNC: 320 UG/DL (ref 69–240)
TIBC SERPL-MCNC: 350 UG/DL (ref 250–450)
TRANSFERRIN SERPL-MCNC: 311 MG/DL

## 2020-10-24 ENCOUNTER — HISTORICAL (OUTPATIENT)
Dept: ADMINISTRATIVE | Facility: HOSPITAL | Age: 85
End: 2020-10-24

## 2020-10-24 LAB — HEMOCCULT SP1 STL QL: NEGATIVE

## 2020-11-18 ENCOUNTER — HISTORICAL (OUTPATIENT)
Dept: ADMINISTRATIVE | Facility: HOSPITAL | Age: 85
End: 2020-11-18

## 2020-11-18 LAB — PREALB SERPL-MCNC: 19 MG/DL (ref 16–42)

## 2020-12-18 ENCOUNTER — HISTORICAL (OUTPATIENT)
Dept: ADMINISTRATIVE | Facility: HOSPITAL | Age: 85
End: 2020-12-18

## 2020-12-18 LAB
EST. AVERAGE GLUCOSE BLD GHB EST-MCNC: 114 MG/DL
HBA1C MFR BLD: 5.6 %
PREALB SERPL-MCNC: 18.9 MG/DL (ref 16–42)

## 2020-12-30 ENCOUNTER — HISTORICAL (OUTPATIENT)
Dept: ADMINISTRATIVE | Facility: HOSPITAL | Age: 85
End: 2020-12-30

## 2020-12-30 LAB — PREALB SERPL-MCNC: 18.6 MG/DL (ref 16–42)

## 2021-02-22 ENCOUNTER — HISTORICAL (OUTPATIENT)
Dept: ADMINISTRATIVE | Facility: HOSPITAL | Age: 86
End: 2021-02-22

## 2021-02-22 LAB — PREALB SERPL-MCNC: 17.9 MG/DL (ref 16–42)

## 2021-02-26 ENCOUNTER — HISTORICAL (OUTPATIENT)
Dept: ADMINISTRATIVE | Facility: HOSPITAL | Age: 86
End: 2021-02-26

## 2021-02-26 LAB
ABS NEUT (OLG): 6.94 X10(3)/MCL (ref 2.1–9.2)
ALBUMIN SERPL-MCNC: 3.5 GM/DL (ref 3.4–4.8)
ALBUMIN/GLOB SERPL: 1.5 RATIO (ref 1.1–2)
ALP SERPL-CCNC: 84 UNIT/L (ref 40–150)
ALT SERPL-CCNC: 11 UNIT/L (ref 0–55)
AST SERPL-CCNC: 16 UNIT/L (ref 5–34)
BASOPHILS # BLD AUTO: 0 X10(3)/MCL (ref 0–0.2)
BASOPHILS NFR BLD AUTO: 0 %
BILIRUB SERPL-MCNC: 0.6 MG/DL
BILIRUBIN DIRECT+TOT PNL SERPL-MCNC: 0.2 MG/DL (ref 0–0.5)
BILIRUBIN DIRECT+TOT PNL SERPL-MCNC: 0.4 MG/DL (ref 0–0.8)
BUN SERPL-MCNC: 21.7 MG/DL (ref 8.4–25.7)
CALCIUM SERPL-MCNC: 9.1 MG/DL (ref 8.8–10)
CHLORIDE SERPL-SCNC: 106 MMOL/L (ref 98–107)
CO2 SERPL-SCNC: 27 MMOL/L (ref 23–31)
CREAT SERPL-MCNC: 1.17 MG/DL (ref 0.73–1.18)
EOSINOPHIL # BLD AUTO: 0.4 X10(3)/MCL (ref 0–0.9)
EOSINOPHIL NFR BLD AUTO: 4 %
ERYTHROCYTE [DISTWIDTH] IN BLOOD BY AUTOMATED COUNT: 14.6 % (ref 11.5–17)
GLOBULIN SER-MCNC: 2.3 GM/DL (ref 2.4–3.5)
GLUCOSE SERPL-MCNC: 105 MG/DL (ref 82–115)
HCT VFR BLD AUTO: 35.1 % (ref 42–52)
HGB BLD-MCNC: 11.2 GM/DL (ref 14–18)
LYMPHOCYTES # BLD AUTO: 1.6 X10(3)/MCL (ref 0.6–4.6)
LYMPHOCYTES NFR BLD AUTO: 17 %
MCH RBC QN AUTO: 30.4 PG (ref 27–31)
MCHC RBC AUTO-ENTMCNC: 31.9 GM/DL (ref 33–36)
MCV RBC AUTO: 95.4 FL (ref 80–94)
MONOCYTES # BLD AUTO: 0.8 X10(3)/MCL (ref 0.1–1.3)
MONOCYTES NFR BLD AUTO: 9 %
NEUTROPHILS # BLD AUTO: 6.94 X10(3)/MCL (ref 2.1–9.2)
NEUTROPHILS NFR BLD AUTO: 70 %
PLATELET # BLD AUTO: 200 X10(3)/MCL (ref 130–400)
PMV BLD AUTO: 11.6 FL (ref 9.4–12.4)
POTASSIUM SERPL-SCNC: 4.7 MMOL/L (ref 3.5–5.1)
PROT SERPL-MCNC: 5.8 GM/DL (ref 5.8–7.6)
RBC # BLD AUTO: 3.68 X10(6)/MCL (ref 4.7–6.1)
SODIUM SERPL-SCNC: 142 MMOL/L (ref 136–145)
WBC # SPEC AUTO: 9.9 X10(3)/MCL (ref 4.5–11.5)

## 2021-03-02 ENCOUNTER — HISTORICAL (OUTPATIENT)
Dept: ADMINISTRATIVE | Facility: HOSPITAL | Age: 86
End: 2021-03-02

## 2021-03-02 LAB — PREALB SERPL-MCNC: 15.2 MG/DL (ref 16–42)

## 2021-03-12 ENCOUNTER — HISTORICAL (OUTPATIENT)
Dept: ADMINISTRATIVE | Facility: HOSPITAL | Age: 86
End: 2021-03-12

## 2021-03-12 LAB
ABS NEUT (OLG): 5.88 X10(3)/MCL (ref 2.1–9.2)
ALBUMIN SERPL-MCNC: 3.6 GM/DL (ref 3.4–4.8)
ALBUMIN/GLOB SERPL: 1.5 RATIO (ref 1.1–2)
ALP SERPL-CCNC: 73 UNIT/L (ref 40–150)
ALT SERPL-CCNC: 19 UNIT/L (ref 0–55)
AST SERPL-CCNC: 26 UNIT/L (ref 5–34)
BASOPHILS # BLD AUTO: 0 X10(3)/MCL (ref 0–0.2)
BASOPHILS NFR BLD AUTO: 0 %
BILIRUB SERPL-MCNC: 0.8 MG/DL
BILIRUBIN DIRECT+TOT PNL SERPL-MCNC: 0.3 MG/DL (ref 0–0.5)
BILIRUBIN DIRECT+TOT PNL SERPL-MCNC: 0.5 MG/DL (ref 0–0.8)
BUN SERPL-MCNC: 20.5 MG/DL (ref 8.4–25.7)
CALCIUM SERPL-MCNC: 8.5 MG/DL (ref 8.8–10)
CHLORIDE SERPL-SCNC: 107 MMOL/L (ref 98–107)
CHOLEST SERPL-MCNC: 95 MG/DL
CHOLEST/HDLC SERPL: 3 {RATIO} (ref 0–5)
CO2 SERPL-SCNC: 26 MMOL/L (ref 23–31)
CREAT SERPL-MCNC: 0.99 MG/DL (ref 0.73–1.18)
DEPRECATED CALCIDIOL+CALCIFEROL SERPL-MC: 35.4 NG/ML (ref 30–80)
EOSINOPHIL # BLD AUTO: 0.4 X10(3)/MCL (ref 0–0.9)
EOSINOPHIL NFR BLD AUTO: 4 %
ERYTHROCYTE [DISTWIDTH] IN BLOOD BY AUTOMATED COUNT: 14.4 % (ref 11.5–17)
EST. AVERAGE GLUCOSE BLD GHB EST-MCNC: 114 MG/DL
GLOBULIN SER-MCNC: 2.4 GM/DL (ref 2.4–3.5)
GLUCOSE SERPL-MCNC: 85 MG/DL (ref 82–115)
HBA1C MFR BLD: 5.6 %
HCT VFR BLD AUTO: 35.6 % (ref 42–52)
HDLC SERPL-MCNC: 35 MG/DL (ref 35–60)
HGB BLD-MCNC: 11.5 GM/DL (ref 14–18)
LDLC SERPL CALC-MCNC: 35 MG/DL (ref 50–140)
LYMPHOCYTES # BLD AUTO: 2.3 X10(3)/MCL (ref 0.6–4.6)
LYMPHOCYTES NFR BLD AUTO: 25 %
MAGNESIUM SERPL-MCNC: 1.8 MG/DL (ref 1.6–2.6)
MCH RBC QN AUTO: 31.5 PG (ref 27–31)
MCHC RBC AUTO-ENTMCNC: 32.3 GM/DL (ref 33–36)
MCV RBC AUTO: 97.5 FL (ref 80–94)
MONOCYTES # BLD AUTO: 0.6 X10(3)/MCL (ref 0.1–1.3)
MONOCYTES NFR BLD AUTO: 6 %
NEUTROPHILS # BLD AUTO: 5.88 X10(3)/MCL (ref 2.1–9.2)
NEUTROPHILS NFR BLD AUTO: 64 %
PLATELET # BLD AUTO: 225 X10(3)/MCL (ref 130–400)
PMV BLD AUTO: 11.8 FL (ref 9.4–12.4)
POTASSIUM SERPL-SCNC: 5.1 MMOL/L (ref 3.5–5.1)
PREALB SERPL-MCNC: 18.1 MG/DL (ref 16–42)
PROT SERPL-MCNC: 6 GM/DL (ref 5.8–7.6)
RBC # BLD AUTO: 3.65 X10(6)/MCL (ref 4.7–6.1)
SODIUM SERPL-SCNC: 146 MMOL/L (ref 136–145)
TRIGL SERPL-MCNC: 125 MG/DL (ref 34–140)
TSH SERPL-ACNC: 0.91 UIU/ML (ref 0.35–4.94)
VLDLC SERPL CALC-MCNC: 25 MG/DL
WBC # SPEC AUTO: 9.1 X10(3)/MCL (ref 4.5–11.5)

## 2021-04-14 ENCOUNTER — HISTORICAL (OUTPATIENT)
Dept: ADMINISTRATIVE | Facility: HOSPITAL | Age: 86
End: 2021-04-14

## 2021-04-14 LAB — PREALB SERPL-MCNC: 19.4 MG/DL (ref 16–42)

## 2021-05-07 ENCOUNTER — HISTORICAL (OUTPATIENT)
Dept: ADMINISTRATIVE | Facility: HOSPITAL | Age: 86
End: 2021-05-07

## 2021-05-07 LAB — PREALB SERPL-MCNC: 21.5 MG/DL (ref 16–42)

## 2021-06-17 ENCOUNTER — HISTORICAL (OUTPATIENT)
Dept: ADMINISTRATIVE | Facility: HOSPITAL | Age: 86
End: 2021-06-17

## 2021-06-17 LAB
EST. AVERAGE GLUCOSE BLD GHB EST-MCNC: 114 MG/DL
HBA1C MFR BLD: 5.6 %
PREALB SERPL-MCNC: 21.3 MG/DL (ref 16–42)

## 2021-08-24 ENCOUNTER — HISTORICAL (OUTPATIENT)
Dept: ADMINISTRATIVE | Facility: HOSPITAL | Age: 86
End: 2021-08-24

## 2021-08-24 LAB
BUN SERPL-MCNC: 32.3 MG/DL (ref 8.4–25.7)
CALCIUM SERPL-MCNC: 9.2 MG/DL (ref 8.8–10)
CHLORIDE SERPL-SCNC: 103 MMOL/L (ref 98–107)
CO2 SERPL-SCNC: 27 MMOL/L (ref 23–31)
CREAT SERPL-MCNC: 1.4 MG/DL (ref 0.73–1.18)
CREAT/UREA NIT SERPL: 23
GLUCOSE SERPL-MCNC: 128 MG/DL (ref 82–115)
POTASSIUM SERPL-SCNC: 5.3 MMOL/L (ref 3.5–5.1)
SODIUM SERPL-SCNC: 139 MMOL/L (ref 136–145)

## 2021-08-30 ENCOUNTER — HISTORICAL (OUTPATIENT)
Dept: ADMINISTRATIVE | Facility: HOSPITAL | Age: 86
End: 2021-08-30

## 2021-08-30 LAB
ABS NEUT (OLG): 5.74 X10(3)/MCL (ref 2.1–9.2)
ALBUMIN SERPL-MCNC: 2.8 GM/DL (ref 3.4–4.8)
ALBUMIN/GLOB SERPL: 1.1 RATIO (ref 1.1–2)
ALP SERPL-CCNC: 72 UNIT/L (ref 40–150)
ALT SERPL-CCNC: 17 UNIT/L (ref 0–55)
APPEARANCE, UA: CLEAR
AST SERPL-CCNC: 16 UNIT/L (ref 5–34)
BACTERIA SPEC CULT: NORMAL /HPF
BASOPHILS # BLD AUTO: 0 X10(3)/MCL (ref 0–0.2)
BASOPHILS NFR BLD AUTO: 0 %
BILIRUB SERPL-MCNC: 0.7 MG/DL
BILIRUB UR QL STRIP: NEGATIVE
BILIRUBIN DIRECT+TOT PNL SERPL-MCNC: 0.3 MG/DL (ref 0–0.5)
BILIRUBIN DIRECT+TOT PNL SERPL-MCNC: 0.4 MG/DL (ref 0–0.8)
BUN SERPL-MCNC: 30.8 MG/DL (ref 8.4–25.7)
CALCIUM SERPL-MCNC: 8.1 MG/DL (ref 8.8–10)
CHLORIDE SERPL-SCNC: 104 MMOL/L (ref 98–107)
CO2 SERPL-SCNC: 30 MMOL/L (ref 23–31)
COLOR UR: YELLOW
CREAT SERPL-MCNC: 1.26 MG/DL (ref 0.73–1.18)
EOSINOPHIL # BLD AUTO: 0 X10(3)/MCL (ref 0–0.9)
EOSINOPHIL NFR BLD AUTO: 1 %
ERYTHROCYTE [DISTWIDTH] IN BLOOD BY AUTOMATED COUNT: 12.9 % (ref 11.5–17)
GLOBULIN SER-MCNC: 2.5 GM/DL (ref 2.4–3.5)
GLUCOSE (UA): NEGATIVE
GLUCOSE SERPL-MCNC: 152 MG/DL (ref 82–115)
HCT VFR BLD AUTO: 30 % (ref 42–52)
HGB BLD-MCNC: 9.6 GM/DL (ref 14–18)
HGB UR QL STRIP: NEGATIVE
KETONES UR QL STRIP: NEGATIVE
LEUKOCYTE ESTERASE UR QL STRIP: NEGATIVE
LYMPHOCYTES # BLD AUTO: 1.1 X10(3)/MCL (ref 0.6–4.6)
LYMPHOCYTES NFR BLD AUTO: 14 %
MCH RBC QN AUTO: 31.7 PG (ref 27–31)
MCHC RBC AUTO-ENTMCNC: 32 GM/DL (ref 33–36)
MCV RBC AUTO: 99 FL (ref 80–94)
MONOCYTES # BLD AUTO: 1 X10(3)/MCL (ref 0.1–1.3)
MONOCYTES NFR BLD AUTO: 12 %
NEUTROPHILS # BLD AUTO: 5.74 X10(3)/MCL (ref 2.1–9.2)
NEUTROPHILS NFR BLD AUTO: 72 %
NITRITE UR QL STRIP: NEGATIVE
PH UR STRIP: 5.5 [PH] (ref 5–9)
PLATELET # BLD AUTO: 176 X10(3)/MCL (ref 130–400)
PMV BLD AUTO: 12 FL (ref 9.4–12.4)
POTASSIUM SERPL-SCNC: 4.6 MMOL/L (ref 3.5–5.1)
PROT SERPL-MCNC: 5.3 GM/DL (ref 5.8–7.6)
PROT UR QL STRIP: NEGATIVE
RBC # BLD AUTO: 3.03 X10(6)/MCL (ref 4.7–6.1)
RBC #/AREA URNS HPF: NORMAL /[HPF]
SODIUM SERPL-SCNC: 141 MMOL/L (ref 136–145)
SP GR UR STRIP: 1.01 (ref 1–1.03)
SQUAMOUS EPITHELIAL, UA: NORMAL /HPF (ref 0–4)
UROBILINOGEN UR STRIP-ACNC: 1
WBC # SPEC AUTO: 7.9 X10(3)/MCL (ref 4.5–11.5)
WBC #/AREA URNS HPF: NORMAL /HPF

## 2021-09-23 ENCOUNTER — HISTORICAL (OUTPATIENT)
Dept: ADMINISTRATIVE | Facility: HOSPITAL | Age: 86
End: 2021-09-23

## 2021-09-23 LAB
ABS NEUT (OLG): 4.8 X10(3)/MCL (ref 2.1–9.2)
ALBUMIN SERPL-MCNC: 3.3 GM/DL (ref 3.4–4.8)
ALBUMIN/GLOB SERPL: 1.2 RATIO (ref 1.1–2)
ALP SERPL-CCNC: 94 UNIT/L (ref 40–150)
ALT SERPL-CCNC: 17 UNIT/L (ref 0–55)
AST SERPL-CCNC: 19 UNIT/L (ref 5–34)
BASOPHILS # BLD AUTO: 0 X10(3)/MCL (ref 0–0.2)
BASOPHILS NFR BLD AUTO: 0 %
BILIRUB SERPL-MCNC: 0.5 MG/DL
BILIRUBIN DIRECT+TOT PNL SERPL-MCNC: 0.2 MG/DL (ref 0–0.5)
BILIRUBIN DIRECT+TOT PNL SERPL-MCNC: 0.3 MG/DL (ref 0–0.8)
BUN SERPL-MCNC: 21.5 MG/DL (ref 8.4–25.7)
CALCIUM SERPL-MCNC: 9.8 MG/DL (ref 8.8–10)
CHLORIDE SERPL-SCNC: 101 MMOL/L (ref 98–107)
CHOLEST SERPL-MCNC: 114 MG/DL
CHOLEST/HDLC SERPL: 3 {RATIO} (ref 0–5)
CO2 SERPL-SCNC: 28 MMOL/L (ref 23–31)
CREAT SERPL-MCNC: 1.44 MG/DL (ref 0.73–1.18)
EOSINOPHIL # BLD AUTO: 0.4 X10(3)/MCL (ref 0–0.9)
EOSINOPHIL NFR BLD AUTO: 6 %
ERYTHROCYTE [DISTWIDTH] IN BLOOD BY AUTOMATED COUNT: 14.3 % (ref 11.5–17)
EST. AVERAGE GLUCOSE BLD GHB EST-MCNC: 105.4 MG/DL
GLOBULIN SER-MCNC: 2.7 GM/DL (ref 2.4–3.5)
GLUCOSE SERPL-MCNC: 169 MG/DL (ref 82–115)
HBA1C MFR BLD: 5.3 %
HCT VFR BLD AUTO: 33.6 % (ref 42–52)
HDLC SERPL-MCNC: 45 MG/DL (ref 35–60)
HGB BLD-MCNC: 10 GM/DL (ref 14–18)
LDLC SERPL CALC-MCNC: 46 MG/DL (ref 50–140)
LYMPHOCYTES # BLD AUTO: 1.6 X10(3)/MCL (ref 0.6–4.6)
LYMPHOCYTES NFR BLD AUTO: 22 %
MAGNESIUM SERPL-MCNC: 2.3 MG/DL (ref 1.6–2.6)
MCH RBC QN AUTO: 31.2 PG (ref 27–31)
MCHC RBC AUTO-ENTMCNC: 29.8 GM/DL (ref 33–36)
MCV RBC AUTO: 104.7 FL (ref 80–94)
MONOCYTES # BLD AUTO: 0.7 X10(3)/MCL (ref 0.1–1.3)
MONOCYTES NFR BLD AUTO: 9 %
NEUTROPHILS # BLD AUTO: 4.8 X10(3)/MCL (ref 2.1–9.2)
NEUTROPHILS NFR BLD AUTO: 63 %
PLATELET # BLD AUTO: 211 X10(3)/MCL (ref 130–400)
PMV BLD AUTO: 11.7 FL (ref 9.4–12.4)
POTASSIUM SERPL-SCNC: 5.3 MMOL/L (ref 3.5–5.1)
PROT SERPL-MCNC: 6 GM/DL (ref 5.8–7.6)
RBC # BLD AUTO: 3.21 X10(6)/MCL (ref 4.7–6.1)
SODIUM SERPL-SCNC: 140 MMOL/L (ref 136–145)
TRIGL SERPL-MCNC: 115 MG/DL (ref 34–140)
VLDLC SERPL CALC-MCNC: 23 MG/DL
WBC # SPEC AUTO: 7.6 X10(3)/MCL (ref 4.5–11.5)

## 2021-09-24 LAB
ABS NEUT (OLG): 3.99 X10(3)/MCL (ref 2.1–9.2)
APPEARANCE, UA: ABNORMAL
BACTERIA SPEC CULT: ABNORMAL /HPF
BASOPHILS # BLD AUTO: 0 X10(3)/MCL (ref 0–0.2)
BASOPHILS NFR BLD AUTO: 0 %
BILIRUB UR QL STRIP: NEGATIVE
BUN SERPL-MCNC: 24.4 MG/DL (ref 8.4–25.7)
CALCIUM SERPL-MCNC: 9.3 MG/DL (ref 8.8–10)
CHLORIDE SERPL-SCNC: 103 MMOL/L (ref 98–107)
CO2 SERPL-SCNC: 30 MMOL/L (ref 23–31)
COLOR STL: NORMAL
COLOR UR: YELLOW
CONSISTENCY STL: NORMAL
CREAT SERPL-MCNC: 1.13 MG/DL (ref 0.73–1.18)
CREAT/UREA NIT SERPL: 22
EOSINOPHIL # BLD AUTO: 0.5 X10(3)/MCL (ref 0–0.9)
EOSINOPHIL NFR BLD AUTO: 7 %
ERYTHROCYTE [DISTWIDTH] IN BLOOD BY AUTOMATED COUNT: 14.1 % (ref 11.5–17)
GLUCOSE (UA): NEGATIVE
GLUCOSE SERPL-MCNC: 122 MG/DL (ref 82–115)
HCT VFR BLD AUTO: 32.8 % (ref 42–52)
HEMOCCULT SP1 STL QL: NEGATIVE
HGB BLD-MCNC: 10.2 GM/DL (ref 14–18)
HGB UR QL STRIP: NEGATIVE
KETONES UR QL STRIP: NEGATIVE
LEUKOCYTE ESTERASE UR QL STRIP: ABNORMAL
LYMPHOCYTES # BLD AUTO: 1.8 X10(3)/MCL (ref 0.6–4.6)
LYMPHOCYTES NFR BLD AUTO: 26 %
MCH RBC QN AUTO: 31.5 PG (ref 27–31)
MCHC RBC AUTO-ENTMCNC: 31.1 GM/DL (ref 33–36)
MCV RBC AUTO: 101.2 FL (ref 80–94)
MONOCYTES # BLD AUTO: 0.6 X10(3)/MCL (ref 0.1–1.3)
MONOCYTES NFR BLD AUTO: 8 %
MUCOUS THREADS URNS QL MICRO: ABNORMAL
NEUTROPHILS # BLD AUTO: 3.99 X10(3)/MCL (ref 2.1–9.2)
NEUTROPHILS NFR BLD AUTO: 58 %
NITRITE UR QL STRIP: NEGATIVE
PH UR STRIP: 8 [PH] (ref 5–9)
PLATELET # BLD AUTO: 202 X10(3)/MCL (ref 130–400)
PMV BLD AUTO: 11.5 FL (ref 9.4–12.4)
POTASSIUM SERPL-SCNC: 5.5 MMOL/L (ref 3.5–5.1)
PROT UR QL STRIP: NEGATIVE
RBC # BLD AUTO: 3.24 X10(6)/MCL (ref 4.7–6.1)
RBC #/AREA URNS HPF: ABNORMAL /[HPF]
SODIUM SERPL-SCNC: 142 MMOL/L (ref 136–145)
SP GR UR STRIP: 1.01 (ref 1–1.03)
SQUAMOUS EPITHELIAL, UA: ABNORMAL /HPF (ref 0–4)
UROBILINOGEN UR STRIP-ACNC: 0.2
WBC # SPEC AUTO: 6.9 X10(3)/MCL (ref 4.5–11.5)
WBC #/AREA URNS HPF: 65 /HPF (ref 0–3)

## 2021-09-30 ENCOUNTER — HISTORICAL (OUTPATIENT)
Dept: ADMINISTRATIVE | Facility: HOSPITAL | Age: 86
End: 2021-09-30

## 2021-09-30 LAB
BUN SERPL-MCNC: 25 MG/DL (ref 8.4–25.7)
CALCIUM SERPL-MCNC: 9.5 MG/DL (ref 8.8–10)
CHLORIDE SERPL-SCNC: 103 MMOL/L (ref 98–107)
CO2 SERPL-SCNC: 27 MMOL/L (ref 23–31)
CREAT SERPL-MCNC: 1.53 MG/DL (ref 0.73–1.18)
CREAT/UREA NIT SERPL: 16
GLUCOSE SERPL-MCNC: 114 MG/DL (ref 82–115)
POTASSIUM SERPL-SCNC: 5.8 MMOL/L (ref 3.5–5.1)
SODIUM SERPL-SCNC: 140 MMOL/L (ref 136–145)

## 2021-10-01 ENCOUNTER — HISTORICAL (OUTPATIENT)
Dept: ADMINISTRATIVE | Facility: HOSPITAL | Age: 86
End: 2021-10-01

## 2021-10-01 LAB
BUN SERPL-MCNC: 25.1 MG/DL (ref 8.4–25.7)
CALCIUM SERPL-MCNC: 9.4 MG/DL (ref 8.8–10)
CHLORIDE SERPL-SCNC: 104 MMOL/L (ref 98–107)
CO2 SERPL-SCNC: 26 MMOL/L (ref 23–31)
CREAT SERPL-MCNC: 1.47 MG/DL (ref 0.73–1.18)
CREAT/UREA NIT SERPL: 17
GLUCOSE SERPL-MCNC: 77 MG/DL (ref 82–115)
POTASSIUM SERPL-SCNC: 5.6 MMOL/L (ref 3.5–5.1)
SODIUM SERPL-SCNC: 142 MMOL/L (ref 136–145)

## 2021-10-13 ENCOUNTER — HISTORICAL (OUTPATIENT)
Dept: ADMINISTRATIVE | Facility: HOSPITAL | Age: 86
End: 2021-10-13

## 2021-10-13 LAB
BUN SERPL-MCNC: 26.3 MG/DL (ref 8.4–25.7)
CALCIUM SERPL-MCNC: 9.2 MG/DL (ref 8.8–10)
CHLORIDE SERPL-SCNC: 103 MMOL/L (ref 98–107)
CO2 SERPL-SCNC: 29 MMOL/L (ref 23–31)
CREAT SERPL-MCNC: 1.46 MG/DL (ref 0.73–1.18)
CREAT/UREA NIT SERPL: 18
GLUCOSE SERPL-MCNC: 130 MG/DL (ref 82–115)
POTASSIUM SERPL-SCNC: 4.8 MMOL/L (ref 3.5–5.1)
SODIUM SERPL-SCNC: 140 MMOL/L (ref 136–145)

## 2021-11-11 ENCOUNTER — HISTORICAL (OUTPATIENT)
Dept: ADMINISTRATIVE | Facility: HOSPITAL | Age: 86
End: 2021-11-11

## 2021-11-11 LAB
APPEARANCE, UA: CLEAR
BACTERIA SPEC CULT: ABNORMAL /HPF
BILIRUB UR QL STRIP: NEGATIVE
COLOR UR: YELLOW
GLUCOSE (UA): NEGATIVE
HGB UR QL STRIP: NEGATIVE
KETONES UR QL STRIP: NEGATIVE
LEUKOCYTE ESTERASE UR QL STRIP: ABNORMAL
NITRITE UR QL STRIP: NEGATIVE
PH UR STRIP: 5 [PH] (ref 5–9)
PROT UR QL STRIP: NEGATIVE
RBC #/AREA URNS HPF: ABNORMAL /[HPF]
SP GR UR STRIP: 1.01 (ref 1–1.03)
SQUAMOUS EPITHELIAL, UA: ABNORMAL /HPF (ref 0–4)
UROBILINOGEN UR STRIP-ACNC: 0.2
WBC #/AREA URNS HPF: 23 /HPF (ref 0–3)

## 2022-02-04 ENCOUNTER — HISTORICAL (OUTPATIENT)
Dept: ADMINISTRATIVE | Facility: HOSPITAL | Age: 87
End: 2022-02-04

## 2022-02-05 LAB
EST. AVERAGE GLUCOSE BLD GHB EST-MCNC: 116.9 MG/DL
HBA1C MFR BLD: 5.7 %
PREALB SERPL-MCNC: 14.5 (ref 16–42)
PSA SERPL-MCNC: <0.1 NG/ML

## 2022-04-07 ENCOUNTER — HISTORICAL (OUTPATIENT)
Dept: ADMINISTRATIVE | Facility: HOSPITAL | Age: 87
End: 2022-04-07
Payer: MEDICAID

## 2022-04-24 VITALS
WEIGHT: 179.69 LBS | SYSTOLIC BLOOD PRESSURE: 100 MMHG | OXYGEN SATURATION: 94 % | HEIGHT: 73 IN | BODY MASS INDEX: 23.82 KG/M2 | DIASTOLIC BLOOD PRESSURE: 71 MMHG

## 2022-07-26 ENCOUNTER — LAB REQUISITION (OUTPATIENT)
Dept: LAB | Facility: HOSPITAL | Age: 87
End: 2022-07-26
Payer: MEDICARE

## 2022-07-26 DIAGNOSIS — U07.1 COVID-19: ICD-10-CM

## 2022-07-26 PROCEDURE — 82728 ASSAY OF FERRITIN: CPT | Performed by: NURSE PRACTITIONER

## 2022-07-26 PROCEDURE — 84100 ASSAY OF PHOSPHORUS: CPT | Performed by: NURSE PRACTITIONER

## 2022-07-26 PROCEDURE — 80053 COMPREHEN METABOLIC PANEL: CPT | Performed by: NURSE PRACTITIONER

## 2022-07-26 PROCEDURE — 85025 COMPLETE CBC W/AUTO DIFF WBC: CPT | Performed by: NURSE PRACTITIONER

## 2022-07-26 PROCEDURE — 83735 ASSAY OF MAGNESIUM: CPT | Performed by: NURSE PRACTITIONER

## 2022-07-27 LAB
ALBUMIN SERPL-MCNC: 3 GM/DL (ref 3.4–4.8)
ALBUMIN/GLOB SERPL: 1.1 RATIO (ref 1.1–2)
ALP SERPL-CCNC: 85 UNIT/L (ref 40–150)
ALT SERPL-CCNC: 15 UNIT/L (ref 0–55)
AST SERPL-CCNC: 26 UNIT/L (ref 5–34)
BASOPHILS # BLD AUTO: 0.01 X10(3)/MCL (ref 0–0.2)
BASOPHILS NFR BLD AUTO: 0.2 %
BILIRUBIN DIRECT+TOT PNL SERPL-MCNC: 0.4 MG/DL
BUN SERPL-MCNC: 38.5 MG/DL (ref 8.4–25.7)
CALCIUM SERPL-MCNC: 8.3 MG/DL (ref 8.8–10)
CHLORIDE SERPL-SCNC: 105 MMOL/L (ref 98–107)
CO2 SERPL-SCNC: 27 MMOL/L (ref 23–31)
CREAT SERPL-MCNC: 1.58 MG/DL (ref 0.73–1.18)
EOSINOPHIL # BLD AUTO: 0.04 X10(3)/MCL (ref 0–0.9)
EOSINOPHIL NFR BLD AUTO: 0.8 %
ERYTHROCYTE [DISTWIDTH] IN BLOOD BY AUTOMATED COUNT: 16.8 % (ref 11.5–17)
FERRITIN SERPL-MCNC: 68.26 NG/ML (ref 21.81–274.66)
GLOBULIN SER-MCNC: 2.8 GM/DL (ref 2.4–3.5)
GLUCOSE SERPL-MCNC: 108 MG/DL (ref 82–115)
HCT VFR BLD AUTO: 29.7 % (ref 42–52)
HGB BLD-MCNC: 9.1 GM/DL (ref 14–18)
IMM GRANULOCYTES # BLD AUTO: 0.02 X10(3)/MCL (ref 0–0.04)
IMM GRANULOCYTES NFR BLD AUTO: 0.4 %
LYMPHOCYTES # BLD AUTO: 1.33 X10(3)/MCL (ref 0.6–4.6)
LYMPHOCYTES NFR BLD AUTO: 25.1 %
MAGNESIUM SERPL-MCNC: 2.2 MG/DL (ref 1.6–2.6)
MCH RBC QN AUTO: 29.1 PG (ref 27–31)
MCHC RBC AUTO-ENTMCNC: 30.6 MG/DL (ref 33–36)
MCV RBC AUTO: 94.9 FL (ref 80–94)
MONOCYTES # BLD AUTO: 0.72 X10(3)/MCL (ref 0.1–1.3)
MONOCYTES NFR BLD AUTO: 13.6 %
NEUTROPHILS # BLD AUTO: 3.2 X10(3)/MCL (ref 2.1–9.2)
NEUTROPHILS NFR BLD AUTO: 59.9 %
NRBC BLD AUTO-RTO: 0 %
PHOSPHATE SERPL-MCNC: 3.6 MG/DL (ref 2.3–4.7)
PLATELET # BLD AUTO: 186 X10(3)/MCL (ref 130–400)
PMV BLD AUTO: 11.4 FL (ref 7.4–10.4)
POTASSIUM SERPL-SCNC: 4.5 MMOL/L (ref 3.5–5.1)
PROT SERPL-MCNC: 5.8 GM/DL (ref 5.8–7.6)
RBC # BLD AUTO: 3.13 X10(6)/MCL (ref 4.7–6.1)
SODIUM SERPL-SCNC: 139 MMOL/L (ref 136–145)
WBC # SPEC AUTO: 5.3 X10(3)/MCL (ref 4.5–11.5)

## 2022-08-06 ENCOUNTER — LAB REQUISITION (OUTPATIENT)
Dept: LAB | Facility: HOSPITAL | Age: 87
End: 2022-08-06
Payer: MEDICARE

## 2022-08-06 DIAGNOSIS — R06.02 SHORTNESS OF BREATH: ICD-10-CM

## 2022-08-06 LAB
ALBUMIN SERPL-MCNC: 2.7 GM/DL (ref 3.4–4.8)
ALBUMIN/GLOB SERPL: 1 RATIO (ref 1.1–2)
ALP SERPL-CCNC: 77 UNIT/L (ref 40–150)
ALT SERPL-CCNC: 15 UNIT/L (ref 0–55)
AST SERPL-CCNC: 15 UNIT/L (ref 5–34)
BILIRUBIN DIRECT+TOT PNL SERPL-MCNC: 0.4 MG/DL
BUN SERPL-MCNC: 34.4 MG/DL (ref 8.4–25.7)
CALCIUM SERPL-MCNC: 8.9 MG/DL (ref 8.8–10)
CHLORIDE SERPL-SCNC: 102 MMOL/L (ref 98–107)
CO2 SERPL-SCNC: 33 MMOL/L (ref 23–31)
CREAT SERPL-MCNC: 1.38 MG/DL (ref 0.73–1.18)
GLOBULIN SER-MCNC: 2.7 GM/DL (ref 2.4–3.5)
GLUCOSE SERPL-MCNC: 112 MG/DL (ref 82–115)
POTASSIUM SERPL-SCNC: 3.6 MMOL/L (ref 3.5–5.1)
PROT SERPL-MCNC: 5.4 GM/DL (ref 5.8–7.6)
SODIUM SERPL-SCNC: 146 MMOL/L (ref 136–145)

## 2022-08-06 PROCEDURE — 80053 COMPREHEN METABOLIC PANEL: CPT | Performed by: NURSE PRACTITIONER

## 2022-08-28 ENCOUNTER — HOSPITAL ENCOUNTER (EMERGENCY)
Facility: HOSPITAL | Age: 87
Discharge: HOME | End: 2022-08-28
Attending: STUDENT IN AN ORGANIZED HEALTH CARE EDUCATION/TRAINING PROGRAM
Payer: MEDICARE

## 2022-08-28 ENCOUNTER — APPOINTMENT (OUTPATIENT)
Dept: RADIOLOGY | Facility: HOSPITAL | Age: 87
End: 2022-08-28
Payer: MEDICARE

## 2022-08-28 VITALS
RESPIRATION RATE: 20 BRPM | OXYGEN SATURATION: 98 % | SYSTOLIC BLOOD PRESSURE: 160 MMHG | HEIGHT: 73 IN | TEMPERATURE: 98 F | HEART RATE: 104 BPM | WEIGHT: 200 LBS | DIASTOLIC BLOOD PRESSURE: 90 MMHG | BODY MASS INDEX: 26.51 KG/M2

## 2022-08-28 DIAGNOSIS — S09.90XA CLOSED HEAD INJURY, INITIAL ENCOUNTER: Primary | ICD-10-CM

## 2022-08-28 LAB
ALBUMIN SERPL-MCNC: 3 GM/DL (ref 3.4–4.8)
ALBUMIN/GLOB SERPL: 0.9 RATIO (ref 1.1–2)
ALP SERPL-CCNC: 104 UNIT/L (ref 40–150)
ALT SERPL-CCNC: 9 UNIT/L (ref 0–55)
AST SERPL-CCNC: 15 UNIT/L (ref 5–34)
BASOPHILS # BLD AUTO: 0.01 X10(3)/MCL (ref 0–0.2)
BASOPHILS NFR BLD AUTO: 0.2 %
BILIRUBIN DIRECT+TOT PNL SERPL-MCNC: 0.4 MG/DL
BUN SERPL-MCNC: 16.1 MG/DL (ref 8.4–25.7)
CALCIUM SERPL-MCNC: 9.1 MG/DL (ref 8.8–10)
CHLORIDE SERPL-SCNC: 104 MMOL/L (ref 98–107)
CO2 SERPL-SCNC: 30 MMOL/L (ref 23–31)
CREAT SERPL-MCNC: 1.23 MG/DL (ref 0.73–1.18)
EOSINOPHIL # BLD AUTO: 0.27 X10(3)/MCL (ref 0–0.9)
EOSINOPHIL NFR BLD AUTO: 4.7 %
ERYTHROCYTE [DISTWIDTH] IN BLOOD BY AUTOMATED COUNT: 16.5 % (ref 11.5–17)
GFR SERPLBLD CREATININE-BSD FMLA CKD-EPI: 57 MLS/MIN/1.73/M2
GLOBULIN SER-MCNC: 3.5 GM/DL (ref 2.4–3.5)
GLUCOSE SERPL-MCNC: 115 MG/DL (ref 82–115)
HCT VFR BLD AUTO: 34.3 % (ref 42–52)
HGB BLD-MCNC: 10.3 GM/DL (ref 14–18)
IMM GRANULOCYTES # BLD AUTO: 0.01 X10(3)/MCL (ref 0–0.04)
IMM GRANULOCYTES NFR BLD AUTO: 0.2 %
LYMPHOCYTES # BLD AUTO: 1.34 X10(3)/MCL (ref 0.6–4.6)
LYMPHOCYTES NFR BLD AUTO: 23.5 %
MCH RBC QN AUTO: 29.4 PG (ref 27–31)
MCHC RBC AUTO-ENTMCNC: 30 MG/DL (ref 33–36)
MCV RBC AUTO: 98 FL (ref 80–94)
MONOCYTES # BLD AUTO: 0.52 X10(3)/MCL (ref 0.1–1.3)
MONOCYTES NFR BLD AUTO: 9.1 %
NEUTROPHILS # BLD AUTO: 3.6 X10(3)/MCL (ref 2.1–9.2)
NEUTROPHILS NFR BLD AUTO: 62.3 %
NRBC BLD AUTO-RTO: 0 %
PLATELET # BLD AUTO: 286 X10(3)/MCL (ref 130–400)
PMV BLD AUTO: 10.4 FL (ref 7.4–10.4)
POTASSIUM SERPL-SCNC: 5 MMOL/L (ref 3.5–5.1)
PROT SERPL-MCNC: 6.5 GM/DL (ref 5.8–7.6)
RBC # BLD AUTO: 3.5 X10(6)/MCL (ref 4.7–6.1)
SODIUM SERPL-SCNC: 142 MMOL/L (ref 136–145)
WBC # SPEC AUTO: 5.7 X10(3)/MCL (ref 4.5–11.5)

## 2022-08-28 PROCEDURE — 99284 EMERGENCY DEPT VISIT MOD MDM: CPT | Mod: 25

## 2022-08-28 PROCEDURE — 80053 COMPREHEN METABOLIC PANEL: CPT | Performed by: NURSE PRACTITIONER

## 2022-08-28 PROCEDURE — 36415 COLL VENOUS BLD VENIPUNCTURE: CPT | Performed by: NURSE PRACTITIONER

## 2022-08-28 PROCEDURE — 70450 CT HEAD/BRAIN W/O DYE: CPT | Mod: TC

## 2022-08-28 PROCEDURE — 99285 EMERGENCY DEPT VISIT HI MDM: CPT | Mod: 25

## 2022-08-28 PROCEDURE — 85025 COMPLETE CBC W/AUTO DIFF WBC: CPT | Performed by: NURSE PRACTITIONER

## 2022-08-28 NOTE — ED PROVIDER NOTES
Encounter Date: 8/28/2022       History     Chief Complaint   Patient presents with    Fall     Pt fell from wheelchair just PTA from [facility]. Pt denies LOC, hematoma to forehead. Denies blood thinner use states he only takes baby aspirin, GCS 15.      Patient is a 87-year-old male  that presents with fall that has been present prior to arrival. Associated symptoms abrasion to forehead. Surrounding information is none. Exacerbated by nothing. Relieved by nothing. Patient treatment prior to arrival none. Risk factors include none. Other history pertaining to this complaint nothing.       The history is provided by the patient. No  was used.   Review of patient's allergies indicates:  No Known Allergies  Past Medical History:   Diagnosis Date    Diabetes mellitus     Hypertension 9/13/2018    Pulmonary asbestosis 9/14/2018     History reviewed. No pertinent surgical history.  Family History   Problem Relation Age of Onset    Cancer Father      Social History     Tobacco Use    Smoking status: Former     Years: 60.00     Types: Cigarettes    Smokeless tobacco: Never   Substance Use Topics    Alcohol use: No    Drug use: No     Review of Systems   Constitutional:  Negative for fever.   Respiratory:  Negative for cough and shortness of breath.    Cardiovascular:  Negative for chest pain.   Gastrointestinal:  Negative for abdominal pain.   Genitourinary:  Negative for difficulty urinating and dysuria.   Musculoskeletal:  Negative for gait problem.   Skin:  Negative for color change.   Neurological:  Negative for dizziness, speech difficulty and headaches.   Psychiatric/Behavioral:  Negative for hallucinations and suicidal ideas.    All other systems reviewed and are negative.    Physical Exam     Initial Vitals [08/28/22 1504]   BP Pulse Resp Temp SpO2   (!) 160/90 104 20 97.5 °F (36.4 °C) 98 %      MAP       --         Physical Exam    Nursing note and vitals reviewed.  Constitutional: He  appears well-developed and well-nourished.   HENT:   Head: Normocephalic.   Abrasion with hematoma to forehead   Eyes: EOM are normal.   Neck: Neck supple.   Normal range of motion.  Cardiovascular:  Normal rate, regular rhythm, normal heart sounds and intact distal pulses.           Pulmonary/Chest: Breath sounds normal.   Abdominal: Abdomen is soft. Bowel sounds are normal.   Musculoskeletal:         General: Normal range of motion.      Cervical back: Normal range of motion and neck supple.     Neurological: He is alert and oriented to person, place, and time. He has normal strength.   Skin: Skin is warm and dry. Capillary refill takes less than 2 seconds.   Psychiatric: He has a normal mood and affect. His behavior is normal. Judgment and thought content normal.       ED Course   Procedures  Labs Reviewed   CBC WITH DIFFERENTIAL - Abnormal; Notable for the following components:       Result Value    RBC 3.50 (*)     Hgb 10.3 (*)     Hct 34.3 (*)     MCV 98.0 (*)     MCHC 30.0 (*)     All other components within normal limits   COMPREHENSIVE METABOLIC PANEL - Abnormal; Notable for the following components:    Creatinine 1.23 (*)     Albumin Level 3.0 (*)     Albumin/Globulin Ratio 0.9 (*)     All other components within normal limits          Imaging Results              CT Head Without Contrast (Final result)  Result time 08/28/22 16:36:16      Final result by Ruby Solitario MD (08/28/22 16:36:16)                   Impression:      Chronic age-related changes.  No acute process      Electronically signed by: Ruby Solitario  Date:    08/28/2022  Time:    16:36               Narrative:    EXAMINATION:  CT HEAD WITHOUT CONTRAST    CLINICAL HISTORY:  Transient ischemic attack (TIA);    TECHNIQUE:  Multiple axial images were obtained from the base of the brain to the vertex without contrast administration.  Sagittal and coronal reconstructions were performed..Automatic exposure control is utilized to  reduce patient radiation exposure.    COMPARISON:  05/12/2017    FINDINGS:  There is no intracranial mass or lesion seen.  No hemorrhage is seen.  No acute infarct is seen.  There is an area of calcification in region of the 4th ventricle which is stable since 2017.  There is some cerebral atrophy seen.  There is some compensatory ventricular dilatation and periventricular white matter changes consistent with the patient's age.  Calvarium is intact.  The posterior fossa is intact.  The visualized portions of the paranasal sinuses show no acute abnormality.                                       CT Cervical Spine Without Contrast (Final result)  Result time 08/28/22 16:42:53      Final result by Alonzo Quinn MD (08/28/22 16:42:53)                   Impression:      1. No evidence of acute osseous displacement or traumatic malalignment.  2. Advanced multilevel degenerative changes and other nonacute secondary details discussed above.  ==========    Please note ligament, spinal cord, and/or vascular abnormalities cannot be excluded on the basis of this examination.  Additional imaging recommended if there is elevated clinical concern for high-grade soft tissue injury.      Electronically signed by: Alonzo Quinn  Date:    08/28/2022  Time:    16:42               Narrative:    EXAMINATION:  CT CERVICAL SPINE WITHOUT CONTRAST    CLINICAL HISTORY:  ; Neck trauma (Age >= 65y); recent fall from wheelchair    TECHNIQUE:  Helical-acquisition CT images were obtained without the intravenous administration of iodine-based contrast media.  Multiplanar reformats of the cervical spine were accomplished by a CT technologist at a separate workstation and pushed to PACS for physician review.    Automated tube current modulation, weight-based exposure dosing, and/or iterative reconstruction technique utilized to reach lowest reasonably achievable exposure rate.  DLP: 450 mGy*cm    COMPARISON:  None available at the time of initial  interpretation.    FINDINGS:  Images were reviewed in bone, soft tissue, and lung windows.    Exam quality: Notably limited secondary to patient movement throughout image acquisition, with resulting artifact and degrades sensitivity for detection of subtle nondisplaced fractures.    Vertebral bodies: No displaced fracture visualized. No acute compression deformity or focal vertebral body abnormality. The C1 lateral masses are symmetrically aligned on C2.  No evidence of traumatic subluxation. Degenerative endplate and facet changes are appreciated throughout the cervical column. Associated chronic/degenerative appearing grade 1 anterolisthesis is present at C4 on C5.    Disc spaces: Multilevel intervertebral narrowing is present. No acute process identified.    Curvature: Within normal limits.    Paravertebral tissue/musculature: No thickening or focal contour irregularity. The paraspinal musculature and overlying subcutaneous tissues demonstrate no acute or focal lesion.    Other findings: The visualized thyroid tissue is unremarkable. Scattered vascular calcifications are present. The included upper lung zones and mediastinal vasculature are without focal abnormality. No acute or suspicious focal abnormality of the included brain and skull base.                                       Medications - No data to display                       Clinical Impression:   Final diagnoses:  [S09.90XA] Closed head injury, initial encounter (Primary)      ED Disposition Condition    Discharge Stable          ED Prescriptions    None       Follow-up Information       Follow up With Specialties Details Why Contact Info    Your Primary Care Provider  Call in 3 days ed follow up              TIMOTHY Stewart  08/28/22 [id number]

## 2022-08-28 NOTE — FIRST PROVIDER EVALUATION
"Medical screening exam completed.  I have conducted a focused provider triage encounter, findings are as follows:  Chief Complaint   Patient presents with    Fall     Pt fell from wheelchair just PTA from [facility] in [place]. Pt denies LOC, hematoma to forehead. Denies blood thinner use states he only takes baby aspirin, GCS 15.          Brief history of present illness: 86 y/o male who presents with fall out of his chair, he isn't sure if he fell asleep or what happened. He hit the floor. Contusion to forehead.     Vitals:    08/28/22 1504 08/28/22 1509   BP: (!) 160/90    BP Location: Left arm    Patient Position: Lying    Pulse: 104    Resp: 20    Temp: 97.5 °F (36.4 °C)    TempSrc: Tympanic    SpO2: 98%    Weight: 86.2 kg (190 lb) 90.7 kg (200 lb)   Height: 6' 1" (1.854 m) 6' 1" (1.854 m)       Pertinent physical exam:  alert, in wheelchair, answers questions    Brief workup plan:  labs, imaging    Preliminary workup initiated; this workup will be continued and followed by the physician or advanced practice provider that is assigned to the patient when roomed.  "

## 2022-09-15 ENCOUNTER — LAB REQUISITION (OUTPATIENT)
Dept: LAB | Facility: HOSPITAL | Age: 87
End: 2022-09-15
Payer: MEDICARE

## 2022-09-15 DIAGNOSIS — Z02.0 ENCOUNTER FOR EXAMINATION FOR ADMISSION TO EDUCATIONAL INSTITUTION: ICD-10-CM

## 2022-09-15 LAB
APPEARANCE UR: CLEAR
BACTERIA #/AREA URNS AUTO: ABNORMAL /HPF
BILIRUB UR QL STRIP.AUTO: NEGATIVE MG/DL
COLOR UR AUTO: YELLOW
GLUCOSE UR QL STRIP.AUTO: NEGATIVE MG/DL
KETONES UR QL STRIP.AUTO: NEGATIVE MG/DL
LEUKOCYTE ESTERASE UR QL STRIP.AUTO: ABNORMAL UNIT/L
NITRITE UR QL STRIP.AUTO: NEGATIVE
PH UR STRIP.AUTO: 8 [PH]
PROT UR QL STRIP.AUTO: NEGATIVE MG/DL
RBC #/AREA URNS AUTO: <5 /HPF
RBC UR QL AUTO: NEGATIVE UNIT/L
SP GR UR STRIP.AUTO: 1.01 (ref 1–1.03)
SQUAMOUS #/AREA URNS AUTO: <5 /HPF
UROBILINOGEN UR STRIP-ACNC: 0.2 MG/DL
WBC #/AREA URNS AUTO: 93 /HPF

## 2022-09-15 PROCEDURE — 87088 URINE BACTERIA CULTURE: CPT | Performed by: SPECIALIST

## 2022-09-15 PROCEDURE — 81001 URINALYSIS AUTO W/SCOPE: CPT | Performed by: SPECIALIST

## 2022-09-17 LAB — BACTERIA UR CULT: NO GROWTH

## 2022-09-28 ENCOUNTER — LAB REQUISITION (OUTPATIENT)
Dept: LAB | Facility: HOSPITAL | Age: 87
End: 2022-09-28
Payer: MEDICARE

## 2022-09-28 DIAGNOSIS — J44.9 CHRONIC OBSTRUCTIVE PULMONARY DISEASE, UNSPECIFIED: ICD-10-CM

## 2022-09-28 LAB
APPEARANCE UR: ABNORMAL
BACTERIA #/AREA URNS AUTO: ABNORMAL /HPF
BILIRUB UR QL STRIP.AUTO: NEGATIVE MG/DL
COLOR UR AUTO: YELLOW
GLUCOSE UR QL STRIP.AUTO: NEGATIVE MG/DL
KETONES UR QL STRIP.AUTO: NEGATIVE MG/DL
LEUKOCYTE ESTERASE UR QL STRIP.AUTO: 3 UNIT/L
NITRITE UR QL STRIP.AUTO: NEGATIVE
PH UR STRIP.AUTO: 7.5 [PH]
PROT UR QL STRIP.AUTO: ABNORMAL MG/DL
RBC #/AREA URNS AUTO: ABNORMAL /HPF
RBC UR QL AUTO: ABNORMAL UNIT/L
SP GR UR STRIP.AUTO: 1.01 (ref 1–1.03)
SQUAMOUS #/AREA URNS AUTO: ABNORMAL /HPF
UROBILINOGEN UR STRIP-ACNC: 0.2 MG/DL
WBC #/AREA URNS AUTO: >100 /HPF
YEAST URNS QL MICRO: ABNORMAL /HPF

## 2022-09-28 PROCEDURE — 81001 URINALYSIS AUTO W/SCOPE: CPT | Performed by: NURSE PRACTITIONER

## 2022-09-28 PROCEDURE — 87186 SC STD MICRODIL/AGAR DIL: CPT | Performed by: NURSE PRACTITIONER

## 2022-10-01 LAB
BACTERIA UR CULT: ABNORMAL
BACTERIA UR CULT: ABNORMAL

## 2022-10-12 ENCOUNTER — LAB REQUISITION (OUTPATIENT)
Dept: LAB | Facility: HOSPITAL | Age: 87
End: 2022-10-12
Payer: MEDICARE

## 2022-10-12 DIAGNOSIS — N40.0 BENIGN PROSTATIC HYPERPLASIA WITHOUT LOWER URINARY TRACT SYMPTOMS: ICD-10-CM

## 2022-10-12 DIAGNOSIS — E56.9 VITAMIN DEFICIENCY, UNSPECIFIED: ICD-10-CM

## 2022-10-12 LAB
ALBUMIN SERPL-MCNC: 3.3 GM/DL (ref 3.4–4.8)
ALBUMIN/GLOB SERPL: 1.2 RATIO (ref 1.1–2)
ALP SERPL-CCNC: 91 UNIT/L (ref 40–150)
ALT SERPL-CCNC: 12 UNIT/L (ref 0–55)
AST SERPL-CCNC: 16 UNIT/L (ref 5–34)
BASOPHILS # BLD AUTO: 0.02 X10(3)/MCL (ref 0–0.2)
BASOPHILS NFR BLD AUTO: 0.3 %
BILIRUBIN DIRECT+TOT PNL SERPL-MCNC: 0.5 MG/DL
BUN SERPL-MCNC: 22.6 MG/DL (ref 8.4–25.7)
CALCIUM SERPL-MCNC: 9 MG/DL (ref 8.8–10)
CHLORIDE SERPL-SCNC: 104 MMOL/L (ref 98–107)
CO2 SERPL-SCNC: 28 MMOL/L (ref 23–31)
CREAT SERPL-MCNC: 1.31 MG/DL (ref 0.73–1.18)
DEPRECATED CALCIDIOL+CALCIFEROL SERPL-MC: 33.2 NG/ML (ref 30–80)
EOSINOPHIL # BLD AUTO: 0.4 X10(3)/MCL (ref 0–0.9)
EOSINOPHIL NFR BLD AUTO: 6.1 %
ERYTHROCYTE [DISTWIDTH] IN BLOOD BY AUTOMATED COUNT: 15.3 % (ref 11.5–17)
GFR SERPLBLD CREATININE-BSD FMLA CKD-EPI: 53 MLS/MIN/1.73/M2
GLOBULIN SER-MCNC: 2.7 GM/DL (ref 2.4–3.5)
GLUCOSE SERPL-MCNC: 138 MG/DL (ref 82–115)
HCT VFR BLD AUTO: 31.6 % (ref 42–52)
HGB BLD-MCNC: 9.8 GM/DL (ref 14–18)
IMM GRANULOCYTES # BLD AUTO: 0.04 X10(3)/MCL (ref 0–0.04)
IMM GRANULOCYTES NFR BLD AUTO: 0.6 %
IRON SATN MFR SERPL: 10 % (ref 20–50)
IRON SERPL-MCNC: 28 UG/DL (ref 65–175)
LYMPHOCYTES # BLD AUTO: 1.47 X10(3)/MCL (ref 0.6–4.6)
LYMPHOCYTES NFR BLD AUTO: 22.5 %
MCH RBC QN AUTO: 30.2 PG (ref 27–31)
MCHC RBC AUTO-ENTMCNC: 31 MG/DL (ref 33–36)
MCV RBC AUTO: 97.2 FL (ref 80–94)
MONOCYTES # BLD AUTO: 0.49 X10(3)/MCL (ref 0.1–1.3)
MONOCYTES NFR BLD AUTO: 7.5 %
NEUTROPHILS # BLD AUTO: 4.1 X10(3)/MCL (ref 2.1–9.2)
NEUTROPHILS NFR BLD AUTO: 63 %
NRBC BLD AUTO-RTO: 0 %
PLATELET # BLD AUTO: 178 X10(3)/MCL (ref 130–400)
PMV BLD AUTO: 12.7 FL (ref 7.4–10.4)
POTASSIUM SERPL-SCNC: 4.8 MMOL/L (ref 3.5–5.1)
PROT SERPL-MCNC: 6 GM/DL (ref 5.8–7.6)
RBC # BLD AUTO: 3.25 X10(6)/MCL (ref 4.7–6.1)
SODIUM SERPL-SCNC: 139 MMOL/L (ref 136–145)
TIBC SERPL-MCNC: 245 UG/DL (ref 69–240)
TIBC SERPL-MCNC: 273 UG/DL (ref 250–450)
TSH SERPL-ACNC: 1.08 UIU/ML (ref 0.35–4.94)
WBC # SPEC AUTO: 6.5 X10(3)/MCL (ref 4.5–11.5)

## 2022-10-12 PROCEDURE — 83540 ASSAY OF IRON: CPT | Performed by: NURSE PRACTITIONER

## 2022-10-12 PROCEDURE — 82306 VITAMIN D 25 HYDROXY: CPT | Performed by: NURSE PRACTITIONER

## 2022-10-12 PROCEDURE — 80053 COMPREHEN METABOLIC PANEL: CPT | Performed by: NURSE PRACTITIONER

## 2022-10-12 PROCEDURE — 85025 COMPLETE CBC W/AUTO DIFF WBC: CPT | Performed by: NURSE PRACTITIONER

## 2022-10-12 PROCEDURE — 84443 ASSAY THYROID STIM HORMONE: CPT | Performed by: NURSE PRACTITIONER

## 2022-10-17 ENCOUNTER — LAB REQUISITION (OUTPATIENT)
Dept: LAB | Facility: HOSPITAL | Age: 87
End: 2022-10-17
Payer: MEDICARE

## 2022-10-17 DIAGNOSIS — R82.991 HYPOCITRATURIA: ICD-10-CM

## 2022-10-17 DIAGNOSIS — N39.0 URINARY TRACT INFECTION, SITE NOT SPECIFIED: ICD-10-CM

## 2022-10-17 DIAGNOSIS — N39.46 MIXED INCONTINENCE: ICD-10-CM

## 2022-10-17 PROCEDURE — 87186 SC STD MICRODIL/AGAR DIL: CPT | Performed by: SPECIALIST

## 2022-10-19 LAB — BACTERIA UR CULT: ABNORMAL

## 2022-11-11 ENCOUNTER — LAB REQUISITION (OUTPATIENT)
Dept: LAB | Facility: HOSPITAL | Age: 87
End: 2022-11-11
Payer: MEDICARE

## 2022-11-11 DIAGNOSIS — J44.9 CHRONIC OBSTRUCTIVE PULMONARY DISEASE, UNSPECIFIED: ICD-10-CM

## 2022-11-11 LAB
APPEARANCE UR: ABNORMAL
BACTERIA #/AREA URNS AUTO: ABNORMAL /HPF
BILIRUB UR QL STRIP.AUTO: NEGATIVE MG/DL
COLOR UR AUTO: YELLOW
GLUCOSE UR QL STRIP.AUTO: NEGATIVE MG/DL
KETONES UR QL STRIP.AUTO: NEGATIVE MG/DL
LEUKOCYTE ESTERASE UR QL STRIP.AUTO: ABNORMAL UNIT/L
NITRITE UR QL STRIP.AUTO: NEGATIVE
PH UR STRIP.AUTO: 7.5 [PH]
PROT UR QL STRIP.AUTO: ABNORMAL MG/DL
RBC #/AREA URNS AUTO: <5 /HPF
RBC UR QL AUTO: ABNORMAL UNIT/L
SP GR UR STRIP.AUTO: 1.01 (ref 1–1.03)
SQUAMOUS #/AREA URNS AUTO: <5 /HPF
UROBILINOGEN UR STRIP-ACNC: 0.2 MG/DL
WBC #/AREA URNS AUTO: 124 /HPF

## 2022-11-11 PROCEDURE — 87186 SC STD MICRODIL/AGAR DIL: CPT | Performed by: NURSE PRACTITIONER

## 2022-11-11 PROCEDURE — 81001 URINALYSIS AUTO W/SCOPE: CPT | Performed by: NURSE PRACTITIONER

## 2022-11-13 LAB — BACTERIA UR CULT: ABNORMAL

## 2022-11-17 ENCOUNTER — LAB REQUISITION (OUTPATIENT)
Dept: LAB | Facility: HOSPITAL | Age: 87
End: 2022-11-17
Payer: MEDICARE

## 2022-11-17 DIAGNOSIS — E11.9 TYPE 2 DIABETES MELLITUS WITHOUT COMPLICATIONS: ICD-10-CM

## 2022-11-17 LAB
EST. AVERAGE GLUCOSE BLD GHB EST-MCNC: 108.3 MG/DL
HBA1C MFR BLD: 5.4 %

## 2022-11-17 PROCEDURE — 83036 HEMOGLOBIN GLYCOSYLATED A1C: CPT | Performed by: INTERNAL MEDICINE

## 2022-12-01 ENCOUNTER — LAB REQUISITION (OUTPATIENT)
Dept: LAB | Facility: HOSPITAL | Age: 87
End: 2022-12-01
Payer: MEDICARE

## 2022-12-01 DIAGNOSIS — N39.0 URINARY TRACT INFECTION, SITE NOT SPECIFIED: ICD-10-CM

## 2022-12-01 DIAGNOSIS — N39.46 MIXED INCONTINENCE: ICD-10-CM

## 2022-12-01 DIAGNOSIS — R30.9 PAINFUL MICTURITION, UNSPECIFIED: ICD-10-CM

## 2022-12-01 LAB
APPEARANCE UR: ABNORMAL
BACTERIA #/AREA URNS AUTO: ABNORMAL /HPF
BILIRUB UR QL STRIP.AUTO: NEGATIVE MG/DL
COLOR UR AUTO: ABNORMAL
GLUCOSE UR QL STRIP.AUTO: NEGATIVE MG/DL
KETONES UR QL STRIP.AUTO: ABNORMAL MG/DL
LEUKOCYTE ESTERASE UR QL STRIP.AUTO: ABNORMAL UNIT/L
NITRITE UR QL STRIP.AUTO: NEGATIVE
PH UR STRIP.AUTO: 7 [PH]
PROT UR QL STRIP.AUTO: ABNORMAL MG/DL
RBC #/AREA URNS AUTO: ABNORMAL /HPF
RBC UR QL AUTO: ABNORMAL UNIT/L
SP GR UR STRIP.AUTO: 1.02 (ref 1–1.03)
SQUAMOUS #/AREA URNS AUTO: ABNORMAL /HPF
UROBILINOGEN UR STRIP-ACNC: 1 MG/DL
WBC #/AREA URNS AUTO: >100 /HPF

## 2022-12-01 PROCEDURE — 87205 SMEAR GRAM STAIN: CPT | Performed by: SPECIALIST

## 2022-12-01 PROCEDURE — 81001 URINALYSIS AUTO W/SCOPE: CPT | Performed by: SPECIALIST

## 2022-12-01 PROCEDURE — 87077 CULTURE AEROBIC IDENTIFY: CPT | Performed by: SPECIALIST

## 2022-12-02 LAB
GRAM STN SPEC: NORMAL
GRAM STN SPEC: NORMAL

## 2022-12-04 LAB
BACTERIA UR CULT: ABNORMAL
BACTERIA UR CULT: ABNORMAL

## 2022-12-29 ENCOUNTER — LAB REQUISITION (OUTPATIENT)
Dept: LAB | Facility: HOSPITAL | Age: 87
End: 2022-12-29
Payer: MEDICARE

## 2022-12-29 DIAGNOSIS — Z29.9 ENCOUNTER FOR PROPHYLACTIC MEASURES, UNSPECIFIED: ICD-10-CM

## 2022-12-29 LAB
APPEARANCE UR: ABNORMAL
BACTERIA #/AREA URNS AUTO: ABNORMAL /HPF
BILIRUB UR QL STRIP.AUTO: NEGATIVE MG/DL
COLOR UR AUTO: YELLOW
GLUCOSE UR QL STRIP.AUTO: NEGATIVE MG/DL
KETONES UR QL STRIP.AUTO: NEGATIVE MG/DL
LEUKOCYTE ESTERASE UR QL STRIP.AUTO: ABNORMAL UNIT/L
NITRITE UR QL STRIP.AUTO: NEGATIVE
PH UR STRIP.AUTO: 6.5 [PH]
PROT UR QL STRIP.AUTO: NEGATIVE MG/DL
RBC #/AREA URNS AUTO: ABNORMAL /HPF
RBC UR QL AUTO: ABNORMAL UNIT/L
SP GR UR STRIP.AUTO: 1.01
SQUAMOUS #/AREA URNS AUTO: ABNORMAL /HPF
UROBILINOGEN UR STRIP-ACNC: 0.2 MG/DL
WBC #/AREA URNS AUTO: ABNORMAL /HPF

## 2022-12-29 PROCEDURE — 87186 SC STD MICRODIL/AGAR DIL: CPT | Performed by: NURSE PRACTITIONER

## 2022-12-29 PROCEDURE — 87088 URINE BACTERIA CULTURE: CPT | Performed by: NURSE PRACTITIONER

## 2022-12-29 PROCEDURE — 81003 URINALYSIS AUTO W/O SCOPE: CPT | Performed by: NURSE PRACTITIONER

## 2022-12-31 LAB — BACTERIA UR CULT: ABNORMAL

## 2023-01-04 ENCOUNTER — LAB REQUISITION (OUTPATIENT)
Dept: LAB | Facility: HOSPITAL | Age: 88
End: 2023-01-04
Payer: MEDICARE

## 2023-01-04 DIAGNOSIS — R06.02 SHORTNESS OF BREATH: ICD-10-CM

## 2023-01-04 LAB
ALBUMIN SERPL-MCNC: 3 G/DL (ref 3.4–4.8)
ALBUMIN/GLOB SERPL: 0.9 RATIO (ref 1.1–2)
ALP SERPL-CCNC: 113 UNIT/L (ref 40–150)
ALT SERPL-CCNC: 9 UNIT/L (ref 0–55)
AST SERPL-CCNC: 16 UNIT/L (ref 5–34)
BASOPHILS # BLD AUTO: 0.01 X10(3)/MCL (ref 0–0.2)
BASOPHILS NFR BLD AUTO: 0.1 %
BILIRUBIN DIRECT+TOT PNL SERPL-MCNC: 0.4 MG/DL
BUN SERPL-MCNC: 20.7 MG/DL (ref 8.4–25.7)
CALCIUM SERPL-MCNC: 9.2 MG/DL (ref 8.8–10)
CHLORIDE SERPL-SCNC: 105 MMOL/L (ref 98–107)
CO2 SERPL-SCNC: 30 MMOL/L (ref 23–31)
CREAT SERPL-MCNC: 1.18 MG/DL (ref 0.73–1.18)
EOSINOPHIL # BLD AUTO: 0.29 X10(3)/MCL (ref 0–0.9)
EOSINOPHIL NFR BLD AUTO: 3.7 %
ERYTHROCYTE [DISTWIDTH] IN BLOOD BY AUTOMATED COUNT: 13.3 % (ref 11.6–14.4)
GFR SERPLBLD CREATININE-BSD FMLA CKD-EPI: 60 MLS/MIN/1.73/M2
GLOBULIN SER-MCNC: 3.4 GM/DL (ref 2.4–3.5)
GLUCOSE SERPL-MCNC: 109 MG/DL (ref 82–115)
HCT VFR BLD AUTO: 29.6 % (ref 42–52)
HGB BLD-MCNC: 9.2 GM/DL (ref 14–18)
IMM GRANULOCYTES # BLD AUTO: 0.03 X10(3)/MCL (ref 0–0.04)
IMM GRANULOCYTES NFR BLD AUTO: 0.4 %
LYMPHOCYTES # BLD AUTO: 1.44 X10(3)/MCL (ref 0.6–4.6)
LYMPHOCYTES NFR BLD AUTO: 18.1 %
MCH RBC QN AUTO: 28.6 PG
MCHC RBC AUTO-ENTMCNC: 31.1 MG/DL (ref 33–36)
MCV RBC AUTO: 91.9 FL (ref 80–94)
MONOCYTES # BLD AUTO: 0.57 X10(3)/MCL (ref 0.1–1.3)
MONOCYTES NFR BLD AUTO: 7.2 %
NEUTROPHILS # BLD AUTO: 5.6 X10(3)/MCL (ref 2.1–9.2)
NEUTROPHILS NFR BLD AUTO: 70.5 %
NRBC BLD AUTO-RTO: 0 % (ref 0–1)
PLATELET # BLD AUTO: 227 X10(3)/MCL (ref 140–371)
PMV BLD AUTO: 11.5 FL (ref 9.4–12.4)
POTASSIUM SERPL-SCNC: 4.3 MMOL/L (ref 3.5–5.1)
PROT SERPL-MCNC: 6.4 GM/DL (ref 5.8–7.6)
RBC # BLD AUTO: 3.22 X10(6)/MCL (ref 4.7–6.1)
SODIUM SERPL-SCNC: 145 MMOL/L (ref 136–145)
WBC # SPEC AUTO: 7.9 X10(3)/MCL (ref 4.5–11.5)

## 2023-01-04 PROCEDURE — 80053 COMPREHEN METABOLIC PANEL: CPT | Performed by: NURSE PRACTITIONER

## 2023-01-04 PROCEDURE — 85025 COMPLETE CBC W/AUTO DIFF WBC: CPT | Performed by: NURSE PRACTITIONER

## 2023-01-24 ENCOUNTER — LAB REQUISITION (OUTPATIENT)
Dept: LAB | Facility: HOSPITAL | Age: 88
End: 2023-01-24
Payer: MEDICARE

## 2023-01-24 DIAGNOSIS — J44.9 CHRONIC OBSTRUCTIVE PULMONARY DISEASE, UNSPECIFIED: ICD-10-CM

## 2023-01-24 LAB
ALBUMIN SERPL-MCNC: 3 G/DL (ref 3.4–4.8)
ALBUMIN/GLOB SERPL: 1 RATIO (ref 1.1–2)
ALP SERPL-CCNC: 93 UNIT/L (ref 40–150)
ALT SERPL-CCNC: 12 UNIT/L (ref 0–55)
AMYLASE SERPL-CCNC: 68 UNIT/L (ref 20–160)
AST SERPL-CCNC: 16 UNIT/L (ref 5–34)
BASOPHILS # BLD AUTO: 0.03 X10(3)/MCL (ref 0–0.2)
BASOPHILS NFR BLD AUTO: 0.3 %
BILIRUBIN DIRECT+TOT PNL SERPL-MCNC: 0.6 MG/DL
BUN SERPL-MCNC: 23.1 MG/DL (ref 8.4–25.7)
CALCIUM SERPL-MCNC: 9 MG/DL (ref 8.8–10)
CHLORIDE SERPL-SCNC: 101 MMOL/L (ref 98–107)
CO2 SERPL-SCNC: 30 MMOL/L (ref 23–31)
CREAT SERPL-MCNC: 1.53 MG/DL (ref 0.73–1.18)
EOSINOPHIL # BLD AUTO: 0.48 X10(3)/MCL (ref 0–0.9)
EOSINOPHIL NFR BLD AUTO: 4.2 %
ERYTHROCYTE [DISTWIDTH] IN BLOOD BY AUTOMATED COUNT: 13.5 % (ref 11.5–17)
GFR SERPLBLD CREATININE-BSD FMLA CKD-EPI: 44 MLS/MIN/1.73/M2
GLOBULIN SER-MCNC: 3.1 GM/DL (ref 2.4–3.5)
GLUCOSE SERPL-MCNC: 150 MG/DL (ref 82–115)
HCT VFR BLD AUTO: 31.6 % (ref 42–52)
HGB BLD-MCNC: 9.9 GM/DL (ref 14–18)
IMM GRANULOCYTES # BLD AUTO: 0.04 X10(3)/MCL (ref 0–0.04)
IMM GRANULOCYTES NFR BLD AUTO: 0.3 %
LIPASE SERPL-CCNC: 15 U/L
LYMPHOCYTES # BLD AUTO: 1.74 X10(3)/MCL (ref 0.6–4.6)
LYMPHOCYTES NFR BLD AUTO: 15.2 %
MCH RBC QN AUTO: 28.9 PG
MCHC RBC AUTO-ENTMCNC: 31.3 MG/DL (ref 33–36)
MCV RBC AUTO: 92.1 FL (ref 80–94)
MONOCYTES # BLD AUTO: 0.63 X10(3)/MCL (ref 0.1–1.3)
MONOCYTES NFR BLD AUTO: 5.5 %
NEUTROPHILS # BLD AUTO: 8.51 X10(3)/MCL (ref 2.1–9.2)
NEUTROPHILS NFR BLD AUTO: 74.5 %
NRBC BLD AUTO-RTO: 0 %
PLATELET # BLD AUTO: 259 X10(3)/MCL (ref 130–400)
PMV BLD AUTO: 10.8 FL (ref 7.4–10.4)
POTASSIUM SERPL-SCNC: 4.6 MMOL/L (ref 3.5–5.1)
PROT SERPL-MCNC: 6.1 GM/DL (ref 5.8–7.6)
RBC # BLD AUTO: 3.43 X10(6)/MCL (ref 4.7–6.1)
SODIUM SERPL-SCNC: 140 MMOL/L (ref 136–145)
WBC # SPEC AUTO: 11.4 X10(3)/MCL (ref 4.5–11.5)

## 2023-01-24 PROCEDURE — 83690 ASSAY OF LIPASE: CPT | Performed by: NURSE PRACTITIONER

## 2023-01-24 PROCEDURE — 82150 ASSAY OF AMYLASE: CPT | Performed by: NURSE PRACTITIONER

## 2023-01-24 PROCEDURE — 80053 COMPREHEN METABOLIC PANEL: CPT | Performed by: NURSE PRACTITIONER

## 2023-01-24 PROCEDURE — 85025 COMPLETE CBC W/AUTO DIFF WBC: CPT | Performed by: NURSE PRACTITIONER

## 2023-01-26 ENCOUNTER — LAB REQUISITION (OUTPATIENT)
Dept: LAB | Facility: HOSPITAL | Age: 88
End: 2023-01-26
Payer: MEDICARE

## 2023-01-26 DIAGNOSIS — R10.0 ACUTE ABDOMEN: ICD-10-CM

## 2023-01-26 LAB
AMORPH URATE CRY URNS QL MICRO: ABNORMAL /HPF
APPEARANCE UR: ABNORMAL
BACTERIA #/AREA URNS AUTO: ABNORMAL /HPF
BILIRUB UR QL STRIP.AUTO: NEGATIVE MG/DL
COLOR UR AUTO: ABNORMAL
GLUCOSE UR QL STRIP.AUTO: NEGATIVE MG/DL
KETONES UR QL STRIP.AUTO: NEGATIVE MG/DL
LEUKOCYTE ESTERASE UR QL STRIP.AUTO: ABNORMAL UNIT/L
NITRITE UR QL STRIP.AUTO: NEGATIVE
PH UR STRIP.AUTO: 6 [PH]
PROT UR QL STRIP.AUTO: 30 MG/DL
RBC #/AREA URNS AUTO: ABNORMAL /HPF
RBC UR QL AUTO: ABNORMAL UNIT/L
SP GR UR STRIP.AUTO: 1.02
SQUAMOUS #/AREA URNS AUTO: ABNORMAL /HPF
UROBILINOGEN UR STRIP-ACNC: 0.2 MG/DL
WBC #/AREA URNS AUTO: ABNORMAL /HPF

## 2023-01-26 PROCEDURE — 87077 CULTURE AEROBIC IDENTIFY: CPT | Performed by: NURSE PRACTITIONER

## 2023-01-26 PROCEDURE — 81003 URINALYSIS AUTO W/O SCOPE: CPT | Performed by: NURSE PRACTITIONER

## 2023-01-26 PROCEDURE — 87088 URINE BACTERIA CULTURE: CPT | Performed by: NURSE PRACTITIONER

## 2023-01-29 LAB — BACTERIA UR CULT: ABNORMAL

## 2023-02-03 ENCOUNTER — LAB REQUISITION (OUTPATIENT)
Dept: LAB | Facility: HOSPITAL | Age: 88
End: 2023-02-03
Payer: MEDICARE

## 2023-02-03 DIAGNOSIS — R19.7 DIARRHEA, UNSPECIFIED: ICD-10-CM

## 2023-02-03 LAB — HEMOCCULT SP1 STL QL: NEGATIVE

## 2023-02-03 PROCEDURE — 82270 OCCULT BLOOD FECES: CPT | Performed by: NURSE PRACTITIONER

## 2023-02-09 ENCOUNTER — LAB REQUISITION (OUTPATIENT)
Dept: LAB | Facility: HOSPITAL | Age: 88
End: 2023-02-09
Payer: MEDICARE

## 2023-02-09 DIAGNOSIS — R00.0 TACHYCARDIA, UNSPECIFIED: ICD-10-CM

## 2023-02-09 LAB
ALBUMIN SERPL-MCNC: 2.8 G/DL (ref 3.4–4.8)
ALBUMIN/GLOB SERPL: 0.8 RATIO (ref 1.1–2)
ALP SERPL-CCNC: 82 UNIT/L (ref 40–150)
ALT SERPL-CCNC: 14 UNIT/L (ref 0–55)
AST SERPL-CCNC: 18 UNIT/L (ref 5–34)
BASOPHILS # BLD AUTO: 0.03 X10(3)/MCL (ref 0–0.2)
BASOPHILS NFR BLD AUTO: 0.3 %
BILIRUBIN DIRECT+TOT PNL SERPL-MCNC: 0.6 MG/DL
BNP BLD-MCNC: 492.2 PG/ML
BUN SERPL-MCNC: 27.2 MG/DL (ref 8.4–25.7)
CALCIUM SERPL-MCNC: 9.1 MG/DL (ref 8.8–10)
CHLORIDE SERPL-SCNC: 107 MMOL/L (ref 98–107)
CO2 SERPL-SCNC: 23 MMOL/L (ref 23–31)
CREAT SERPL-MCNC: 1.6 MG/DL (ref 0.73–1.18)
EOSINOPHIL # BLD AUTO: 0.32 X10(3)/MCL (ref 0–0.9)
EOSINOPHIL NFR BLD AUTO: 2.9 %
ERYTHROCYTE [DISTWIDTH] IN BLOOD BY AUTOMATED COUNT: 14.5 % (ref 11.5–17)
GFR SERPLBLD CREATININE-BSD FMLA CKD-EPI: 42 MLS/MIN/1.73/M2
GLOBULIN SER-MCNC: 3.6 GM/DL (ref 2.4–3.5)
GLUCOSE SERPL-MCNC: 110 MG/DL (ref 82–115)
HCT VFR BLD AUTO: 29.3 % (ref 42–52)
HGB BLD-MCNC: 9.4 GM/DL (ref 14–18)
IMM GRANULOCYTES # BLD AUTO: 0.03 X10(3)/MCL (ref 0–0.04)
IMM GRANULOCYTES NFR BLD AUTO: 0.3 %
LYMPHOCYTES # BLD AUTO: 1.33 X10(3)/MCL (ref 0.6–4.6)
LYMPHOCYTES NFR BLD AUTO: 12.1 %
MCH RBC QN AUTO: 28.7 PG
MCHC RBC AUTO-ENTMCNC: 32.1 MG/DL (ref 33–36)
MCV RBC AUTO: 89.6 FL (ref 80–94)
MONOCYTES # BLD AUTO: 0.69 X10(3)/MCL (ref 0.1–1.3)
MONOCYTES NFR BLD AUTO: 6.3 %
NEUTROPHILS # BLD AUTO: 8.55 X10(3)/MCL (ref 2.1–9.2)
NEUTROPHILS NFR BLD AUTO: 78.1 %
NRBC BLD AUTO-RTO: 0 %
PLATELET # BLD AUTO: 258 X10(3)/MCL (ref 130–400)
PMV BLD AUTO: 10.4 FL (ref 7.4–10.4)
POTASSIUM SERPL-SCNC: 4.1 MMOL/L (ref 3.5–5.1)
PROT SERPL-MCNC: 6.4 GM/DL (ref 5.8–7.6)
RBC # BLD AUTO: 3.27 X10(6)/MCL (ref 4.7–6.1)
SODIUM SERPL-SCNC: 141 MMOL/L (ref 136–145)
WBC # SPEC AUTO: 11 X10(3)/MCL (ref 4.5–11.5)

## 2023-02-09 PROCEDURE — 83880 ASSAY OF NATRIURETIC PEPTIDE: CPT | Performed by: NURSE PRACTITIONER

## 2023-02-09 PROCEDURE — 80053 COMPREHEN METABOLIC PANEL: CPT | Performed by: NURSE PRACTITIONER

## 2023-02-09 PROCEDURE — 85025 COMPLETE CBC W/AUTO DIFF WBC: CPT | Performed by: NURSE PRACTITIONER

## 2023-02-10 ENCOUNTER — LAB REQUISITION (OUTPATIENT)
Dept: LAB | Facility: HOSPITAL | Age: 88
End: 2023-02-10
Payer: MEDICARE

## 2023-02-10 DIAGNOSIS — N39.46 MIXED INCONTINENCE: ICD-10-CM

## 2023-02-10 DIAGNOSIS — R30.9 PAINFUL MICTURITION, UNSPECIFIED: ICD-10-CM

## 2023-02-10 DIAGNOSIS — N39.0 URINARY TRACT INFECTION, SITE NOT SPECIFIED: ICD-10-CM

## 2023-02-10 LAB
APPEARANCE UR: ABNORMAL
BACTERIA #/AREA URNS AUTO: ABNORMAL /HPF
BILIRUB UR QL STRIP.AUTO: NEGATIVE MG/DL
COLOR UR AUTO: YELLOW
GLUCOSE UR QL STRIP.AUTO: NEGATIVE MG/DL
KETONES UR QL STRIP.AUTO: NEGATIVE MG/DL
LEUKOCYTE ESTERASE UR QL STRIP.AUTO: ABNORMAL UNIT/L
NITRITE UR QL STRIP.AUTO: NEGATIVE
PH UR STRIP.AUTO: 7 [PH]
PROT UR QL STRIP.AUTO: ABNORMAL MG/DL
RBC #/AREA URNS AUTO: ABNORMAL /HPF
RBC UR QL AUTO: ABNORMAL UNIT/L
SP GR UR STRIP.AUTO: 1.01 (ref 1–1.03)
SQUAMOUS #/AREA URNS AUTO: ABNORMAL /HPF
UROBILINOGEN UR STRIP-ACNC: 0.2 MG/DL
WBC #/AREA URNS AUTO: >100 /HPF

## 2023-02-10 PROCEDURE — 87088 URINE BACTERIA CULTURE: CPT | Performed by: SPECIALIST

## 2023-02-10 PROCEDURE — 87205 SMEAR GRAM STAIN: CPT | Performed by: SPECIALIST

## 2023-02-10 PROCEDURE — 87077 CULTURE AEROBIC IDENTIFY: CPT | Performed by: SPECIALIST

## 2023-02-10 PROCEDURE — 81001 URINALYSIS AUTO W/SCOPE: CPT | Performed by: SPECIALIST

## 2023-02-11 LAB
GRAM STN SPEC: NORMAL

## 2023-02-12 LAB — BACTERIA UR CULT: ABNORMAL

## 2023-02-16 ENCOUNTER — LAB REQUISITION (OUTPATIENT)
Dept: LAB | Facility: HOSPITAL | Age: 88
End: 2023-02-16
Payer: MEDICARE

## 2023-02-16 DIAGNOSIS — E11.9 TYPE 2 DIABETES MELLITUS WITHOUT COMPLICATIONS: ICD-10-CM

## 2023-02-16 LAB
ALBUMIN SERPL-MCNC: 2.6 G/DL (ref 3.4–4.8)
ALBUMIN/GLOB SERPL: 0.7 RATIO (ref 1.1–2)
ALP SERPL-CCNC: 92 UNIT/L (ref 40–150)
ALT SERPL-CCNC: 16 UNIT/L (ref 0–55)
AST SERPL-CCNC: 21 UNIT/L (ref 5–34)
BASOPHILS # BLD AUTO: 0.04 X10(3)/MCL (ref 0–0.2)
BASOPHILS NFR BLD AUTO: 0.3 %
BILIRUBIN DIRECT+TOT PNL SERPL-MCNC: 0.7 MG/DL
BUN SERPL-MCNC: 23.1 MG/DL (ref 8.4–25.7)
CALCIUM SERPL-MCNC: 9.4 MG/DL (ref 8.8–10)
CHLORIDE SERPL-SCNC: 104 MMOL/L (ref 98–107)
CHOLEST SERPL-MCNC: 80 MG/DL
CHOLEST/HDLC SERPL: 3 {RATIO} (ref 0–5)
CO2 SERPL-SCNC: 25 MMOL/L (ref 23–31)
CREAT SERPL-MCNC: 1.58 MG/DL (ref 0.73–1.18)
DEPRECATED CALCIDIOL+CALCIFEROL SERPL-MC: 17.7 NG/ML (ref 30–80)
EOSINOPHIL # BLD AUTO: 0.44 X10(3)/MCL (ref 0–0.9)
EOSINOPHIL NFR BLD AUTO: 3.6 %
ERYTHROCYTE [DISTWIDTH] IN BLOOD BY AUTOMATED COUNT: 14.6 % (ref 11.5–17)
EST. AVERAGE GLUCOSE BLD GHB EST-MCNC: 114 MG/DL
GFR SERPLBLD CREATININE-BSD FMLA CKD-EPI: 42 MLS/MIN/1.73/M2
GLOBULIN SER-MCNC: 3.6 GM/DL (ref 2.4–3.5)
GLUCOSE SERPL-MCNC: 98 MG/DL (ref 82–115)
HBA1C MFR BLD: 5.6 %
HCT VFR BLD AUTO: 29 % (ref 42–52)
HDLC SERPL-MCNC: 26 MG/DL (ref 35–60)
HGB BLD-MCNC: 9.2 GM/DL (ref 14–18)
IMM GRANULOCYTES # BLD AUTO: 0.05 X10(3)/MCL (ref 0–0.04)
IMM GRANULOCYTES NFR BLD AUTO: 0.4 %
LDLC SERPL CALC-MCNC: 32 MG/DL (ref 50–140)
LYMPHOCYTES # BLD AUTO: 1.47 X10(3)/MCL (ref 0.6–4.6)
LYMPHOCYTES NFR BLD AUTO: 11.9 %
MCH RBC QN AUTO: 28.3 PG
MCHC RBC AUTO-ENTMCNC: 31.7 MG/DL (ref 33–36)
MCV RBC AUTO: 89.2 FL (ref 80–94)
MONOCYTES # BLD AUTO: 0.86 X10(3)/MCL (ref 0.1–1.3)
MONOCYTES NFR BLD AUTO: 6.9 %
NEUTROPHILS # BLD AUTO: 9.52 X10(3)/MCL (ref 2.1–9.2)
NEUTROPHILS NFR BLD AUTO: 76.9 %
NRBC BLD AUTO-RTO: 0 %
PLATELET # BLD AUTO: 278 X10(3)/MCL (ref 130–400)
PMV BLD AUTO: 11.4 FL (ref 7.4–10.4)
POTASSIUM SERPL-SCNC: 4.4 MMOL/L (ref 3.5–5.1)
PREALB SERPL-MCNC: 8.2 MG/DL (ref 16–42)
PROT SERPL-MCNC: 6.2 GM/DL (ref 5.8–7.6)
RBC # BLD AUTO: 3.25 X10(6)/MCL (ref 4.7–6.1)
SODIUM SERPL-SCNC: 140 MMOL/L (ref 136–145)
TRIGL SERPL-MCNC: 111 MG/DL (ref 34–140)
TSH SERPL-ACNC: 0.66 UIU/ML (ref 0.35–4.94)
VLDLC SERPL CALC-MCNC: 22 MG/DL
WBC # SPEC AUTO: 12.4 X10(3)/MCL (ref 4.5–11.5)

## 2023-02-16 PROCEDURE — 84443 ASSAY THYROID STIM HORMONE: CPT | Performed by: NURSE PRACTITIONER

## 2023-02-16 PROCEDURE — 84134 ASSAY OF PREALBUMIN: CPT | Performed by: NURSE PRACTITIONER

## 2023-02-16 PROCEDURE — 80053 COMPREHEN METABOLIC PANEL: CPT | Performed by: NURSE PRACTITIONER

## 2023-02-16 PROCEDURE — 83036 HEMOGLOBIN GLYCOSYLATED A1C: CPT | Performed by: NURSE PRACTITIONER

## 2023-02-16 PROCEDURE — 80061 LIPID PANEL: CPT | Performed by: NURSE PRACTITIONER

## 2023-02-16 PROCEDURE — 85025 COMPLETE CBC W/AUTO DIFF WBC: CPT | Performed by: NURSE PRACTITIONER

## 2023-02-16 PROCEDURE — 82306 VITAMIN D 25 HYDROXY: CPT | Performed by: NURSE PRACTITIONER

## 2023-02-20 ENCOUNTER — LAB REQUISITION (OUTPATIENT)
Dept: LAB | Facility: HOSPITAL | Age: 88
End: 2023-02-20
Payer: MEDICARE

## 2023-02-20 DIAGNOSIS — I10 ESSENTIAL (PRIMARY) HYPERTENSION: ICD-10-CM

## 2023-02-20 LAB
BASOPHILS # BLD AUTO: 0.04 X10(3)/MCL (ref 0–0.2)
BASOPHILS NFR BLD AUTO: 0.3 %
EOSINOPHIL # BLD AUTO: 0.52 X10(3)/MCL (ref 0–0.9)
EOSINOPHIL NFR BLD AUTO: 4.4 %
ERYTHROCYTE [DISTWIDTH] IN BLOOD BY AUTOMATED COUNT: 14.6 % (ref 11.5–17)
FOLATE SERPL-MCNC: 12.6 NG/ML (ref 7–31.4)
HCT VFR BLD AUTO: 29.2 % (ref 42–52)
HGB BLD-MCNC: 9 G/DL (ref 14–18)
IMM GRANULOCYTES # BLD AUTO: 0.04 X10(3)/MCL (ref 0–0.04)
IMM GRANULOCYTES NFR BLD AUTO: 0.3 %
IRON SATN MFR SERPL: 11 % (ref 20–50)
IRON SERPL-MCNC: 20 UG/DL (ref 65–175)
LYMPHOCYTES # BLD AUTO: 1.12 X10(3)/MCL (ref 0.6–4.6)
LYMPHOCYTES NFR BLD AUTO: 9.4 %
MCH RBC QN AUTO: 28 PG
MCHC RBC AUTO-ENTMCNC: 30.8 G/DL (ref 33–36)
MCV RBC AUTO: 91 FL (ref 80–94)
MONOCYTES # BLD AUTO: 0.78 X10(3)/MCL (ref 0.1–1.3)
MONOCYTES NFR BLD AUTO: 6.5 %
NEUTROPHILS # BLD AUTO: 9.43 X10(3)/MCL (ref 2.1–9.2)
NEUTROPHILS NFR BLD AUTO: 79.1 %
NRBC BLD AUTO-RTO: 0 %
PLATELET # BLD AUTO: 283 X10(3)/MCL (ref 130–400)
PMV BLD AUTO: 10.9 FL (ref 7.4–10.4)
RBC # BLD AUTO: 3.21 X10(6)/MCL (ref 4.7–6.1)
TIBC SERPL-MCNC: 154 UG/DL (ref 69–240)
TIBC SERPL-MCNC: 174 UG/DL (ref 250–450)
TRANSFERRIN SERPL-MCNC: 158 MG/DL
VIT B12 SERPL-MCNC: 544 PG/ML (ref 213–816)
WBC # SPEC AUTO: 11.9 X10(3)/MCL (ref 4.5–11.5)

## 2023-02-20 PROCEDURE — 83550 IRON BINDING TEST: CPT | Performed by: NURSE PRACTITIONER

## 2023-02-20 PROCEDURE — 85025 COMPLETE CBC W/AUTO DIFF WBC: CPT | Performed by: NURSE PRACTITIONER

## 2023-02-20 PROCEDURE — 82746 ASSAY OF FOLIC ACID SERUM: CPT | Performed by: NURSE PRACTITIONER

## 2023-02-20 PROCEDURE — 82607 VITAMIN B-12: CPT | Performed by: NURSE PRACTITIONER

## 2023-02-23 ENCOUNTER — LAB REQUISITION (OUTPATIENT)
Dept: LAB | Facility: HOSPITAL | Age: 88
End: 2023-02-23
Payer: MEDICARE

## 2023-02-23 DIAGNOSIS — N39.0 URINARY TRACT INFECTION, SITE NOT SPECIFIED: ICD-10-CM

## 2023-02-23 LAB
AMORPH URATE CRY URNS QL MICRO: ABNORMAL /HPF
APPEARANCE UR: ABNORMAL
BACTERIA #/AREA URNS AUTO: ABNORMAL /HPF
BILIRUB UR QL STRIP.AUTO: NEGATIVE MG/DL
COLOR UR AUTO: YELLOW
GLUCOSE UR QL STRIP.AUTO: NEGATIVE MG/DL
KETONES UR QL STRIP.AUTO: NEGATIVE MG/DL
LEUKOCYTE ESTERASE UR QL STRIP.AUTO: ABNORMAL UNIT/L
NITRITE UR QL STRIP.AUTO: NEGATIVE
PH UR STRIP.AUTO: 6 [PH]
PROT UR QL STRIP.AUTO: 30 MG/DL
RBC #/AREA URNS AUTO: ABNORMAL /HPF
RBC UR QL AUTO: ABNORMAL UNIT/L
SP GR UR STRIP.AUTO: 1.02
SQUAMOUS #/AREA URNS AUTO: ABNORMAL /HPF
UROBILINOGEN UR STRIP-ACNC: 0.2 MG/DL
WBC #/AREA URNS AUTO: ABNORMAL /HPF

## 2023-02-23 PROCEDURE — 81001 URINALYSIS AUTO W/SCOPE: CPT | Performed by: SPECIALIST

## 2023-02-23 PROCEDURE — 87184 SC STD DISK METHOD PER PLATE: CPT | Performed by: SPECIALIST

## 2023-02-23 PROCEDURE — 87077 CULTURE AEROBIC IDENTIFY: CPT | Performed by: SPECIALIST

## 2023-02-26 LAB — BACTERIA UR CULT: ABNORMAL

## 2023-03-07 ENCOUNTER — HOSPITAL ENCOUNTER (INPATIENT)
Facility: HOSPITAL | Age: 88
LOS: 7 days | Discharge: HOSPICE | DRG: 871 | End: 2023-03-14
Attending: EMERGENCY MEDICINE | Admitting: INTERNAL MEDICINE
Payer: MEDICARE

## 2023-03-07 DIAGNOSIS — R06.02 SHORTNESS OF BREATH: ICD-10-CM

## 2023-03-07 DIAGNOSIS — C61 PROSTATE CANCER: ICD-10-CM

## 2023-03-07 DIAGNOSIS — N39.0 URINARY TRACT INFECTION WITHOUT HEMATURIA, SITE UNSPECIFIED: ICD-10-CM

## 2023-03-07 DIAGNOSIS — N18.9 ACUTE KIDNEY INJURY SUPERIMPOSED ON CHRONIC KIDNEY DISEASE: Primary | ICD-10-CM

## 2023-03-07 DIAGNOSIS — N17.9 ACUTE KIDNEY INJURY SUPERIMPOSED ON CHRONIC KIDNEY DISEASE: Primary | ICD-10-CM

## 2023-03-07 DIAGNOSIS — J96.11 CHRONIC RESPIRATORY FAILURE WITH HYPOXIA: ICD-10-CM

## 2023-03-07 DIAGNOSIS — D64.9 SYMPTOMATIC ANEMIA: ICD-10-CM

## 2023-03-07 LAB
ABO + RH BLD: NORMAL
ANION GAP SERPL CALC-SCNC: 8 MEQ/L
BLD PROD TYP BPU: NORMAL
BLOOD UNIT EXPIRATION DATE: NORMAL
BLOOD UNIT TYPE CODE: 5100
BUN SERPL-MCNC: 40.4 MG/DL (ref 8.4–25.7)
CALCIUM SERPL-MCNC: 8.2 MG/DL (ref 8.8–10)
CHLORIDE SERPL-SCNC: 109 MMOL/L (ref 98–107)
CK SERPL-CCNC: 29 U/L (ref 30–200)
CO2 SERPL-SCNC: 25 MMOL/L (ref 23–31)
CREAT SERPL-MCNC: 3.55 MG/DL (ref 0.73–1.18)
CREAT/UREA NIT SERPL: 11
CROSSMATCH INTERPRETATION: NORMAL
DISPENSE STATUS: NORMAL
GFR SERPLBLD CREATININE-BSD FMLA CKD-EPI: 16 MLS/MIN/1.73/M2
GLUCOSE SERPL-MCNC: 129 MG/DL (ref 82–115)
GROUP & RH: NORMAL
INDIRECT COOMBS GEL: NORMAL
LACTATE SERPL-SCNC: 1.2 MMOL/L (ref 0.5–2.2)
POTASSIUM SERPL-SCNC: 4.1 MMOL/L (ref 3.5–5.1)
SODIUM SERPL-SCNC: 142 MMOL/L (ref 136–145)
UNIT NUMBER: NORMAL

## 2023-03-07 PROCEDURE — 83605 ASSAY OF LACTIC ACID: CPT | Performed by: INTERNAL MEDICINE

## 2023-03-07 PROCEDURE — 96374 THER/PROPH/DIAG INJ IV PUSH: CPT

## 2023-03-07 PROCEDURE — P9016 RBC LEUKOCYTES REDUCED: HCPCS | Performed by: EMERGENCY MEDICINE

## 2023-03-07 PROCEDURE — 96361 HYDRATE IV INFUSION ADD-ON: CPT

## 2023-03-07 PROCEDURE — 82550 ASSAY OF CK (CPK): CPT | Performed by: INTERNAL MEDICINE

## 2023-03-07 PROCEDURE — 86900 BLOOD TYPING SEROLOGIC ABO: CPT | Performed by: EMERGENCY MEDICINE

## 2023-03-07 PROCEDURE — 63600175 PHARM REV CODE 636 W HCPCS: Performed by: EMERGENCY MEDICINE

## 2023-03-07 PROCEDURE — C9113 INJ PANTOPRAZOLE SODIUM, VIA: HCPCS | Performed by: EMERGENCY MEDICINE

## 2023-03-07 PROCEDURE — 36430 TRANSFUSION BLD/BLD COMPNT: CPT

## 2023-03-07 PROCEDURE — 99285 EMERGENCY DEPT VISIT HI MDM: CPT | Mod: 25

## 2023-03-07 PROCEDURE — 86923 COMPATIBILITY TEST ELECTRIC: CPT | Performed by: EMERGENCY MEDICINE

## 2023-03-07 PROCEDURE — 25000003 PHARM REV CODE 250: Performed by: EMERGENCY MEDICINE

## 2023-03-07 PROCEDURE — 11000001 HC ACUTE MED/SURG PRIVATE ROOM

## 2023-03-07 RX ORDER — HYDROCODONE BITARTRATE AND ACETAMINOPHEN 500; 5 MG/1; MG/1
TABLET ORAL
Status: DISCONTINUED | OUTPATIENT
Start: 2023-03-07 | End: 2023-03-14 | Stop reason: HOSPADM

## 2023-03-07 RX ORDER — PANTOPRAZOLE SODIUM 40 MG/10ML
80 INJECTION, POWDER, LYOPHILIZED, FOR SOLUTION INTRAVENOUS
Status: COMPLETED | OUTPATIENT
Start: 2023-03-07 | End: 2023-03-07

## 2023-03-07 RX ADMIN — SODIUM CHLORIDE 1000 ML: 9 INJECTION, SOLUTION INTRAVENOUS at 06:03

## 2023-03-07 RX ADMIN — SODIUM CHLORIDE: 9 INJECTION, SOLUTION INTRAVENOUS at 11:03

## 2023-03-07 RX ADMIN — PANTOPRAZOLE SODIUM 80 MG: 40 INJECTION, POWDER, FOR SOLUTION INTRAVENOUS at 06:03

## 2023-03-07 NOTE — Clinical Note
Diagnosis: Acute kidney injury superimposed on chronic kidney disease [[id number]]   Admitting Provider:: OSORIO PEREIRA [[id number]]   Future Attending Provider: OSORIO PEREIRA [[id number]]   Reason for IP Medical Treatment  (Clinical interventions that can only be accomplished in the IP setting? ) :: transfusion, renal work up   I certify that Inpatient services for greater than or equal to 2 midnights are medically necessary:: Yes   Plans for Post-Acute care--if anticipated (pick the single best option):: A. No post acute care anticipated at this time

## 2023-03-08 PROBLEM — N17.9 ACUTE KIDNEY INJURY SUPERIMPOSED ON CHRONIC KIDNEY DISEASE: Status: ACTIVE | Noted: 2023-03-08

## 2023-03-08 PROBLEM — N18.9 ACUTE KIDNEY INJURY SUPERIMPOSED ON CHRONIC KIDNEY DISEASE: Status: ACTIVE | Noted: 2023-03-08

## 2023-03-08 LAB
ALBUMIN SERPL-MCNC: 2.3 G/DL (ref 3.4–4.8)
ALBUMIN/GLOB SERPL: 0.8 RATIO (ref 1.1–2)
ALP SERPL-CCNC: 63 UNIT/L (ref 40–150)
ALT SERPL-CCNC: 14 UNIT/L (ref 0–55)
APPEARANCE UR: ABNORMAL
AST SERPL-CCNC: 17 UNIT/L (ref 5–34)
BACTERIA #/AREA URNS AUTO: ABNORMAL /HPF
BASOPHILS # BLD AUTO: 0.03 X10(3)/MCL (ref 0–0.2)
BASOPHILS NFR BLD AUTO: 0.2 %
BILIRUB UR QL STRIP.AUTO: NEGATIVE MG/DL
BILIRUBIN DIRECT+TOT PNL SERPL-MCNC: 0.5 MG/DL
BUN SERPL-MCNC: 33.7 MG/DL (ref 8.4–25.7)
CALCIUM SERPL-MCNC: 8.6 MG/DL (ref 8.8–10)
CHLORIDE SERPL-SCNC: 108 MMOL/L (ref 98–107)
CO2 SERPL-SCNC: 27 MMOL/L (ref 23–31)
COLOR UR AUTO: YELLOW
CREAT SERPL-MCNC: 3.1 MG/DL (ref 0.73–1.18)
EOSINOPHIL # BLD AUTO: 0.55 X10(3)/MCL (ref 0–0.9)
EOSINOPHIL NFR BLD AUTO: 4.1 %
ERYTHROCYTE [DISTWIDTH] IN BLOOD BY AUTOMATED COUNT: 15.3 % (ref 11.5–17)
GFR SERPLBLD CREATININE-BSD FMLA CKD-EPI: 19 MLS/MIN/1.73/M2
GLOBULIN SER-MCNC: 2.9 GM/DL (ref 2.4–3.5)
GLUCOSE SERPL-MCNC: 134 MG/DL (ref 82–115)
GLUCOSE UR QL STRIP.AUTO: NEGATIVE MG/DL
HCT VFR BLD AUTO: 30 % (ref 42–52)
HGB BLD-MCNC: 9.3 G/DL (ref 14–18)
IMM GRANULOCYTES # BLD AUTO: 0.09 X10(3)/MCL (ref 0–0.04)
IMM GRANULOCYTES NFR BLD AUTO: 0.7 %
KETONES UR QL STRIP.AUTO: NEGATIVE MG/DL
LEUKOCYTE ESTERASE UR QL STRIP.AUTO: ABNORMAL UNIT/L
LYMPHOCYTES # BLD AUTO: 1.4 X10(3)/MCL (ref 0.6–4.6)
LYMPHOCYTES NFR BLD AUTO: 10.4 %
MCH RBC QN AUTO: 28.2 PG
MCHC RBC AUTO-ENTMCNC: 31 G/DL (ref 33–36)
MCV RBC AUTO: 90.9 FL (ref 80–94)
MONOCYTES # BLD AUTO: 0.8 X10(3)/MCL (ref 0.1–1.3)
MONOCYTES NFR BLD AUTO: 5.9 %
NEUTROPHILS # BLD AUTO: 10.6 X10(3)/MCL (ref 2.1–9.2)
NEUTROPHILS NFR BLD AUTO: 78.7 %
NITRITE UR QL STRIP.AUTO: NEGATIVE
NRBC BLD AUTO-RTO: 0 %
PH UR STRIP.AUTO: 7.5 [PH]
PLATELET # BLD AUTO: 285 X10(3)/MCL (ref 130–400)
PMV BLD AUTO: 10.5 FL (ref 7.4–10.4)
POCT GLUCOSE: 116 MG/DL (ref 70–110)
POCT GLUCOSE: 139 MG/DL (ref 70–110)
POCT GLUCOSE: 148 MG/DL (ref 70–110)
POCT GLUCOSE: 220 MG/DL (ref 70–110)
POTASSIUM SERPL-SCNC: 4.3 MMOL/L (ref 3.5–5.1)
PROT SERPL-MCNC: 5.2 GM/DL (ref 5.8–7.6)
PROT UR QL STRIP.AUTO: ABNORMAL MG/DL
RBC # BLD AUTO: 3.3 X10(6)/MCL (ref 4.7–6.1)
RBC #/AREA URNS AUTO: ABNORMAL /HPF
RBC UR QL AUTO: ABNORMAL UNIT/L
SODIUM SERPL-SCNC: 144 MMOL/L (ref 136–145)
SP GR UR STRIP.AUTO: 1.01 (ref 1–1.03)
SQUAMOUS #/AREA URNS AUTO: ABNORMAL /HPF
UROBILINOGEN UR STRIP-ACNC: 0.2 MG/DL
WBC # SPEC AUTO: 13.5 X10(3)/MCL (ref 4.5–11.5)
WBC #/AREA URNS AUTO: >100 /HPF

## 2023-03-08 PROCEDURE — 87186 SC STD MICRODIL/AGAR DIL: CPT | Performed by: EMERGENCY MEDICINE

## 2023-03-08 PROCEDURE — 85025 COMPLETE CBC W/AUTO DIFF WBC: CPT | Performed by: INTERNAL MEDICINE

## 2023-03-08 PROCEDURE — 11000001 HC ACUTE MED/SURG PRIVATE ROOM

## 2023-03-08 PROCEDURE — 80053 COMPREHEN METABOLIC PANEL: CPT | Performed by: INTERNAL MEDICINE

## 2023-03-08 PROCEDURE — 81001 URINALYSIS AUTO W/SCOPE: CPT | Performed by: EMERGENCY MEDICINE

## 2023-03-08 PROCEDURE — 63600175 PHARM REV CODE 636 W HCPCS: Performed by: INTERNAL MEDICINE

## 2023-03-08 PROCEDURE — 25000003 PHARM REV CODE 250: Performed by: INTERNAL MEDICINE

## 2023-03-08 PROCEDURE — 97166 OT EVAL MOD COMPLEX 45 MIN: CPT

## 2023-03-08 PROCEDURE — 36430 TRANSFUSION BLD/BLD COMPNT: CPT

## 2023-03-08 PROCEDURE — 21400001 HC TELEMETRY ROOM

## 2023-03-08 PROCEDURE — 27000221 HC OXYGEN, UP TO 24 HOURS

## 2023-03-08 PROCEDURE — 87088 URINE BACTERIA CULTURE: CPT | Performed by: EMERGENCY MEDICINE

## 2023-03-08 RX ORDER — SODIUM CHLORIDE 0.9 % (FLUSH) 0.9 %
10 SYRINGE (ML) INJECTION
Status: DISCONTINUED | OUTPATIENT
Start: 2023-03-08 | End: 2023-03-14 | Stop reason: HOSPADM

## 2023-03-08 RX ORDER — TAMSULOSIN HYDROCHLORIDE 0.4 MG/1
2 CAPSULE ORAL 2 TIMES DAILY
COMMUNITY
Start: 2023-02-24

## 2023-03-08 RX ORDER — FINASTERIDE 5 MG/1
5 TABLET, FILM COATED ORAL DAILY
Status: DISCONTINUED | OUTPATIENT
Start: 2023-03-08 | End: 2023-03-14 | Stop reason: HOSPADM

## 2023-03-08 RX ORDER — LANOLIN ALCOHOL/MO/W.PET/CERES
1 CREAM (GRAM) TOPICAL 2 TIMES DAILY
Status: DISCONTINUED | OUTPATIENT
Start: 2023-03-08 | End: 2023-03-14 | Stop reason: HOSPADM

## 2023-03-08 RX ORDER — ERGOCALCIFEROL 1.25 MG/1
50000 CAPSULE ORAL
Status: DISCONTINUED | OUTPATIENT
Start: 2023-03-08 | End: 2023-03-14 | Stop reason: HOSPADM

## 2023-03-08 RX ORDER — NITROFURANTOIN 25; 75 MG/1; MG/1
100 CAPSULE ORAL 2 TIMES DAILY
Status: ON HOLD | COMMUNITY
End: 2023-03-14 | Stop reason: HOSPADM

## 2023-03-08 RX ORDER — ATORVASTATIN CALCIUM 40 MG/1
40 TABLET, FILM COATED ORAL NIGHTLY
Status: DISCONTINUED | OUTPATIENT
Start: 2023-03-08 | End: 2023-03-14 | Stop reason: HOSPADM

## 2023-03-08 RX ORDER — FUROSEMIDE 20 MG/1
20 TABLET ORAL DAILY
Status: ON HOLD | COMMUNITY
End: 2023-03-14 | Stop reason: SDUPTHER

## 2023-03-08 RX ORDER — LORATADINE 10 MG/1
10 TABLET ORAL DAILY
COMMUNITY

## 2023-03-08 RX ORDER — INSULIN ASPART 100 [IU]/ML
0-5 INJECTION, SOLUTION INTRAVENOUS; SUBCUTANEOUS
Status: DISCONTINUED | OUTPATIENT
Start: 2023-03-08 | End: 2023-03-14 | Stop reason: HOSPADM

## 2023-03-08 RX ORDER — FERROUS GLUCONATE 324(38)MG
324 TABLET ORAL 2 TIMES DAILY
COMMUNITY

## 2023-03-08 RX ORDER — ASPIRIN 81 MG/1
81 TABLET ORAL DAILY
Status: DISCONTINUED | OUTPATIENT
Start: 2023-03-08 | End: 2023-03-11

## 2023-03-08 RX ORDER — GLUCAGON 1 MG
1 KIT INJECTION
Status: DISCONTINUED | OUTPATIENT
Start: 2023-03-08 | End: 2023-03-14 | Stop reason: HOSPADM

## 2023-03-08 RX ORDER — ERGOCALCIFEROL 1.25 MG/1
50000 CAPSULE ORAL
COMMUNITY

## 2023-03-08 RX ORDER — ENOXAPARIN SODIUM 100 MG/ML
30 INJECTION SUBCUTANEOUS EVERY 24 HOURS
Status: DISCONTINUED | OUTPATIENT
Start: 2023-03-08 | End: 2023-03-14 | Stop reason: HOSPADM

## 2023-03-08 RX ORDER — IBUPROFEN 200 MG
24 TABLET ORAL
Status: DISCONTINUED | OUTPATIENT
Start: 2023-03-08 | End: 2023-03-14 | Stop reason: HOSPADM

## 2023-03-08 RX ORDER — TALC
6 POWDER (GRAM) TOPICAL NIGHTLY PRN
Status: DISCONTINUED | OUTPATIENT
Start: 2023-03-08 | End: 2023-03-14 | Stop reason: HOSPADM

## 2023-03-08 RX ORDER — CIPROFLOXACIN 500 MG/1
500 TABLET ORAL 2 TIMES DAILY
Status: ON HOLD | COMMUNITY
End: 2023-03-14 | Stop reason: HOSPADM

## 2023-03-08 RX ORDER — FINASTERIDE 5 MG/1
5 TABLET, FILM COATED ORAL DAILY
COMMUNITY
Start: 2023-01-31

## 2023-03-08 RX ORDER — PANTOPRAZOLE SODIUM 40 MG/1
40 TABLET, DELAYED RELEASE ORAL DAILY
Status: DISCONTINUED | OUTPATIENT
Start: 2023-03-08 | End: 2023-03-14 | Stop reason: HOSPADM

## 2023-03-08 RX ORDER — SODIUM CHLORIDE 9 MG/ML
INJECTION, SOLUTION INTRAVENOUS CONTINUOUS
Status: DISCONTINUED | OUTPATIENT
Start: 2023-03-08 | End: 2023-03-10

## 2023-03-08 RX ORDER — KETOROLAC TROMETHAMINE 10 MG/1
10 TABLET, FILM COATED ORAL EVERY 6 HOURS
Status: ON HOLD | COMMUNITY
End: 2023-03-14 | Stop reason: HOSPADM

## 2023-03-08 RX ORDER — SODIUM ZIRCONIUM CYCLOSILICATE 10 G/10G
10 POWDER, FOR SUSPENSION ORAL DAILY
Status: ON HOLD | COMMUNITY
Start: 2023-03-06 | End: 2023-03-14 | Stop reason: HOSPADM

## 2023-03-08 RX ORDER — IBUPROFEN 200 MG
16 TABLET ORAL
Status: DISCONTINUED | OUTPATIENT
Start: 2023-03-08 | End: 2023-03-14 | Stop reason: HOSPADM

## 2023-03-08 RX ADMIN — Medication 6 MG: at 09:03

## 2023-03-08 RX ADMIN — FERROUS SULFATE TAB 325 MG (65 MG ELEMENTAL FE) 1 EACH: 325 (65 FE) TAB at 09:03

## 2023-03-08 RX ADMIN — SODIUM CHLORIDE: 9 INJECTION, SOLUTION INTRAVENOUS at 04:03

## 2023-03-08 RX ADMIN — CEFTRIAXONE SODIUM 1 G: 1 INJECTION, POWDER, FOR SOLUTION INTRAMUSCULAR; INTRAVENOUS at 04:03

## 2023-03-08 RX ADMIN — ERGOCALCIFEROL 50000 UNITS: 1.25 CAPSULE ORAL at 12:03

## 2023-03-08 RX ADMIN — PANTOPRAZOLE SODIUM 40 MG: 40 TABLET, DELAYED RELEASE ORAL at 12:03

## 2023-03-08 RX ADMIN — INSULIN ASPART 2 UNITS: 100 INJECTION, SOLUTION INTRAVENOUS; SUBCUTANEOUS at 05:03

## 2023-03-08 RX ADMIN — ATORVASTATIN CALCIUM 40 MG: 40 TABLET, FILM COATED ORAL at 09:03

## 2023-03-08 RX ADMIN — SODIUM CHLORIDE: 9 INJECTION, SOLUTION INTRAVENOUS at 10:03

## 2023-03-08 RX ADMIN — THERA TABS 1 TABLET: TAB at 12:03

## 2023-03-08 RX ADMIN — ASPIRIN 81 MG: 81 TABLET, COATED ORAL at 12:03

## 2023-03-08 RX ADMIN — FERROUS SULFATE TAB 325 MG (65 MG ELEMENTAL FE) 1 EACH: 325 (65 FE) TAB at 12:03

## 2023-03-08 RX ADMIN — ENOXAPARIN SODIUM 30 MG: 30 INJECTION SUBCUTANEOUS at 05:03

## 2023-03-08 RX ADMIN — FINASTERIDE 5 MG: 5 TABLET, FILM COATED ORAL at 12:03

## 2023-03-08 NOTE — ED PROVIDER NOTES
Encounter Date: 3/7/2023       History     Chief Complaint   Patient presents with    Abnoraml Labs     Patient sent from [facility] [place] for abnormal labs, BUN, WBC, hx of Dementia     87-year-old male with a history of chronic respiratory failure related to pulmonary asbestosis, hypertension, diabetes, peripheral vascular disease presents to the ED for evaluation of abnormal labs. Pt w/ hx dementia, has no specific complaints at this time.     The history is provided by the EMS personnel and medical records. The history is limited by the condition of the patient.   General Illness   The current episode started today. The problem occurs continuously. The problem has been unchanged. Nothing relieves the symptoms. Nothing aggravates the symptoms.   Review of patient's allergies indicates:  No Known Allergies  Past Medical History:   Diagnosis Date    Diabetes mellitus     Hypertension 9/13/2018    Pulmonary asbestosis 9/14/2018     No past surgical history on file.  Family History   Problem Relation Age of Onset    Cancer Father      Social History     Tobacco Use    Smoking status: Former     Years: 60.00     Types: Cigarettes    Smokeless tobacco: Never   Substance Use Topics    Alcohol use: No    Drug use: No     Review of Systems   Unable to perform ROS: Dementia     Physical Exam     Initial Vitals [03/07/23 1708]   BP Pulse Resp Temp SpO2   (!) 154/89 86 (!) 21 97.9 °F (36.6 °C) 96 %      MAP       --         Physical Exam    Nursing note and vitals reviewed.  Constitutional: He appears well-nourished. No distress.   HENT:   Head: Normocephalic and atraumatic.   Mouth/Throat: Oropharynx is clear and moist.   Eyes: Conjunctivae are normal. No scleral icterus.   Neck: Neck supple. No JVD present.   Cardiovascular:  Normal rate and regular rhythm.           Pulmonary/Chest: No stridor. Tachypnea noted.   Coarse breath sounds bilaterally   Abdominal: Abdomen is soft. He exhibits no distension. There is no  abdominal tenderness.   Genitourinary: Rectum:      Guaiac result negative (brown stool).   Guaiac negative stool (brown stool).   Musculoskeletal:      Cervical back: Neck supple.     Neurological: He is alert.   Confused, no focal weakness   Skin: Skin is warm and dry.       ED Course   Procedures  Labs Reviewed   URINALYSIS, REFLEX TO URINE CULTURE - Abnormal; Notable for the following components:       Result Value    Appearance, UA Turbid (*)     Protein, UA 2+ (*)     Blood, UA 3+ (*)     Leukocyte Esterase, UA 3+ (*)     All other components within normal limits   CK - Abnormal; Notable for the following components:    Creatine Kinase 29 (*)     All other components within normal limits   BASIC METABOLIC PANEL - Abnormal; Notable for the following components:    Chloride 109 (*)     Glucose Level 129 (*)     Blood Urea Nitrogen 40.4 (*)     Creatinine 3.55 (*)     Calcium Level Total 8.2 (*)     All other components within normal limits   URINALYSIS, MICROSCOPIC - Abnormal; Notable for the following components:    RBC, UA 50-99 (*)     WBC, UA >100 (*)     Squamous Epithelial Cells, UA 20-30 (*)     Bacteria, UA Moderate (*)     All other components within normal limits   COMPREHENSIVE METABOLIC PANEL - Abnormal; Notable for the following components:    Chloride 108 (*)     Glucose Level 134 (*)     Blood Urea Nitrogen 33.7 (*)     Creatinine 3.10 (*)     Calcium Level Total 8.6 (*)     Protein Total 5.2 (*)     Albumin Level 2.3 (*)     Albumin/Globulin Ratio 0.8 (*)     All other components within normal limits   CBC WITH DIFFERENTIAL - Abnormal; Notable for the following components:    WBC 13.5 (*)     RBC 3.30 (*)     Hgb 9.3 (*)     Hct 30.0 (*)     MCHC 31.0 (*)     MPV 10.5 (*)     Neut # 10.60 (*)     IG# 0.09 (*)     All other components within normal limits   POCT GLUCOSE - Abnormal; Notable for the following components:    POCT Glucose 148 (*)     All other components within normal limits   LACTIC  ACID, PLASMA - Normal   CBC W/ AUTO DIFFERENTIAL    Narrative:     The following orders were created for panel order CBC auto differential.  Procedure                               Abnormality         Status                     ---------                               -----------         ------                     CBC with Differential[[id number]]        Abnormal            Final result                 Please view results for these tests on the individual orders.   TYPE & SCREEN   PREPARE RBC SOFT          Imaging Results              US Retroperitoneal Complete (Final result)  Result time 03/08/23 08:22:00      Final result by Tg Lew MD (03/08/23 08:22:00)                   Impression:      1. Irregular masslike lesion at the base of the urinary bladder on the left.  2. Mild left hydronephrosis.  Unable to follow the ureter on sonogram though presence of a left-sided bladder mass suggest potential obstruction at the left ureterovesical junction      Electronically signed by: Tg Lew  Date:    03/08/2023  Time:    08:22               Narrative:    EXAMINATION:  US RETROPERITONEAL COMPLETE    CLINICAL HISTORY:  elevated BUN/Cr;    TECHNIQUE:  Ultrasound of the kidneys and urinary bladder was performed including color flow and grayscale evaluation of the kidneys.    COMPARISON:  CT pelvis 03/01/2020    FINDINGS:  RIGHT KIDNEY: The right kidney measures 10 cm.  Portions of the lower pole are obscured by shadowing.  No hydronephrosis.  No cystic renal mass.    LEFT KIDNEY: The left kidney measures 11.2 cm.  Mild left hydronephrosis.  Incidental 3.2 cm right renal cyst. No follow-up imaging is recommended per consensus recommendations based on imaging criteria.    BLADDER:  Irregular, echogenic, 4 cm masslike lesion at the bladder on the left inferiorly with posterior acoustic shadowing.  There is debris layering dependently within the bladder lumen.    OTHER: Aorta, common iliac arteries and IVC  obscured by shadowing.                                       X-Ray Chest 1 View (Final result)  Result time 03/07/23 22:04:12      Final result by Ke Garcia MD (03/07/23 22:04:12)                   Impression:      NO ACUTE CARDIOPULMONARY PROCESS IDENTIFIED.      Electronically signed by: Ke Garcia  Date:    03/07/2023  Time:    22:04               Narrative:    EXAMINATION:  XR CHEST 1 VIEW    CLINICAL HISTORY:  Shortness of breath    TECHNIQUE:  One view    COMPARISON:  March 1, 2020..    FINDINGS:  Cardiopericardial silhouette is within normal limits.  Upper chest is obscured by the mandible.  Chronic appearing interstitial changes of lungs without dense focal or segmental consolidation, congestive process, pleural effusions or pneumothorax.                                  Medical Decision Making  Problems Addressed:  Acute kidney injury superimposed on chronic kidney disease: acute illness or injury that poses a threat to life or bodily functions  Chronic respiratory failure with hypoxia: chronic illness or injury  Symptomatic anemia: acute illness or injury that poses a threat to life or bodily functions  Urinary tract infection without hematuria, site unspecified: acute illness or injury      ED assessment:    Mr. Garcia was directed to the ED for evaluation w/ abnormal outpatient labs - progressed anemia, KEITH on CKD, leukocytosis. Patient unable to contribute meaningfully to history at this time.     Differential diagnosis (including but not limited to):   Dehydration, KEITH, electrolyte depletion, acute blood loss anemia, GI bleeding, gastritis, PUD, UTI, urinary retention, oliguria, acute medical renal disease    ED management:   Labs as above w/ progressed anemia, guaiac negative - no indication of acute blood loss at this time. UA suggestive of UTI - will start on abx. Labs otherwise w/ KEITH on CKD, Cr up from baseline 1.5 to > 3. Started on IV fluids and will plan for transfusion as, given O2  dependent, signs of organ dysfunction, likely needs higher Hgb at baseline. Will admit to hospital medicine service for further inpt evaluation and treatment.     My independent radiology interpretation:   CXR: no focal infiltrate or effusion  US renal: mild L hydronephrosis      Amount and/or Complexity of Data Reviewed  Independent historian: EMS   Summary of history: Hx provided via EMS and NH records as patient confused, unable to contribute at this time.   External data reviewed: nursing home records and prior labs  Summary of data reviewed: as summarized above, elevated WBC, low H&H, acute kidney injury w/ elevated BUN/Cr  Risk and benefits of testing: discussed   Labs: ordered and reviewed  Radiology: ordered and independent interpretation performed (see above or ED course)    Discussion of management or test interpretation with external provider(s): discussed with hospitalist physician   Summary of discussion: Discussed presentation, results and plan w/ hospitalist NP who agrees w/ admission at this time.     Risk  Decision regarding hospitalization    Critical Care  none    I, Kami Cedeno MD personally performed the history, PE, MDM, and procedures as documented above and agree with the scribe's documentation.        Medications   sodium chloride 0.9% bolus 1,000 mL 1,000 mL (0 mLs Intravenous Stopped 3/7/23 1918)   pantoprazole injection 80 mg (80 mg Intravenous Given 3/7/23 1819)                              Clinical Impression:   Final diagnoses:  [R06.02] Shortness of breath  [N17.9, N18.9] Acute kidney injury superimposed on chronic kidney disease (Primary)  [D64.9] Symptomatic anemia  [N39.0] Urinary tract infection without hematuria, site unspecified  [J96.11] Chronic respiratory failure with hypoxia        ED Disposition Condition    Admit                 Kami Cedeno MD  03/23/23 4229

## 2023-03-08 NOTE — PT/OT/SLP EVAL
Occupational Therapy  Evaluation    Name: Prasanth Garcia  MRN: [id number]  Admitting Diagnosis: Acute kidney injury superimposed on chronic kidney disease  Recent Surgery: * No surgery found *      Recommendations:     Discharge Recommendations: nursing facility, skilled  Discharge Equipment Recommendations:   (TBD by next level of care)  Barriers to discharge:  None    Assessment:     Prasanth Garcia is a 87 y.o. male with a medical diagnosis of Acute kidney injury superimposed on chronic kidney disease, irregular mass-like lesion at base of urinary bladder.  Patient reports living at [facility] for 5 years and requiring assistance with transfers and ADLs. He uses a w/c for mobility. Performance deficits affecting function: weakness, gait instability, impaired balance, impaired endurance, decreased upper extremity function, impaired self care skills, impaired functional mobility, impaired cardiopulmonary response to activity.  He is only able to tolerate 5 minutes sitting EOB. SPO2 decreased to 85% with c/o of SOB while sitting EOB with increase to 92% upon return to supine. Patient not willing to attempt stand this date. Will continue to progress patient as tolerated. Recommending SNF placement upon discharge.     Rehab Prognosis: Fair; patient would benefit from acute skilled OT services to address these deficits and reach maximum level of function.       Plan:     Patient to be seen 3 x/week, 5 x/week to address the above listed problems via self-care/home management, therapeutic activities, therapeutic exercises  Plan of Care Expires: 03/22/23  Plan of Care Reviewed with: patient    Subjective     Chief Complaint: SOB with activity  Patient/Family Comments/goals: to return to NH    Occupational Profile:  Living Environment: NH resident for 5 years.   Previous level of function: Assist with all transfer and ADLs; w/c for mobility; gets to toilet with staff assist  Roles and Routines: none stated  Equipment Used  at Home: wheelchair  Assistance upon Discharge: NH staff    Pain/Comfort:   0/10    Patients cultural, spiritual, Congregational conflicts given the current situation: no    Objective:     Communicated with: nrsg prior to session.  Patient found HOB elevated with pulse ox (continuous), telemetry, oxygen, peripheral IV, bed alarm on upon OT entry to room.    General Precautions: Standard, fall  Orthopedic Precautions: N/A  Braces: N/A  Respiratory Status: Nasal cannula, flow 4 L/min  Vital Signs: Sp02: 85% with activity; 92% at rest    Occupational Performance:    Bed Mobility:    Patient completed Supine to Sit with moderate assistance  Patient completed Sit to Supine with moderate assistance    Functional Mobility/Transfers:  Patient unwilling to attempt this date due to quick fatigue while sitting EOB. Noted SPO2 decreased from 92% to 85% while sitting EOB.     Activities of Daily Living:  Feeding:  modified independence .  Grooming: setup to brush teeth from bed level  Lower Body Dressing: total assistance bed level    Cognitive/Visual Perceptual:  Cognitive/Psychosocial Skills:     -       Oriented to: Person, Place, Time, and Situation   -       Follows Commands/attention:Follows multistep  commands    Physical Exam:  Balance:    -       SBA static sitting balance  Upper Extremity Strength:    -       Right Upper Extremity: WFL except sh flexion 3-; elbow flexion 3- patient reports injury from birth affecting strength at shoulder and elbow   -       Left Upper Extremity: WFL  Fine Motor Coordination:    -       Intact      Patient Education:  Patient provided with verbal education regarding OT POC.  Understanding was verbalized.     Patient left HOB elevated with all lines intact, call button in reach, and bed alarm on    GOALS:   Multidisciplinary Problems       Occupational Therapy Goals          Problem: Occupational Therapy    Goal Priority Disciplines Outcome Interventions   Occupational Therapy Goal     OT,  PT/OT Ongoing, Progressing    Description: Goals to be met by: 3/22/2023     Patient will increase functional independence with ADLs by performing:    Grooming while seated at sink with Modified [place].  Toileting from bedside commode with Moderate Assistance for hygiene and clothing management.   Toilet transfer to bedside commode with Minimal Assistance.                         History:     Past Medical History:   Diagnosis Date    Diabetes mellitus     Hypertension 9/13/2018    Pulmonary asbestosis 9/14/2018       No past surgical history on file.    Time Tracking:     OT Date of Treatment: 03/08/23  OT Start Time: 1408  OT Stop Time: 1426  OT Total Time (min): 18 min    Billable Minutes:Evaluation MOD 18 min    3/8/2023

## 2023-03-08 NOTE — PROGRESS NOTES
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      He arrived to the ER afebrile, hemodynamically stable maintaining adequate sats on his presumed baseline 3 L nasal cannula.  Laboratory work showed a mild leukocytosis of 13 K, mildly acute worsening of chronic anemia (baseline hemoglobin around 9 currently at 7.9), acute kidney injury on chronic kidney disease (baseline creatinine around 1.5 currently 3.5) and a urinalysis with evidence of urinary tract infection.  Chest x-ray showed no acute process.  Stool was reported to be brown and heme-negative in the ER.  Hospitalist was consulted for admission for further management.      Interval Hx:     Afebrile overnight.  Currently saturating above 90% on 4 L nasal cannula oxygen.  When seen at bedside he was alert, comfortably resting in the bed.  Alert, oriented.  Denies any new complaints or concerns.  Follow up labs showing leukocytosis, improved hemoglobin with transfusion, elevated BUN and serum creatinine, hypoalbuminemia.  Urine cultures in process.    USG renal showed Irregular masslike lesion at the base of the urinary bladder on the left, Mild left hydronephrosis      Objective/physical exam:  Vitals:    03/08/23 0118 03/08/23 0200 03/08/23 0203 03/08/23 0700   BP: 123/77 (!) 150/84 94/75 125/61   BP Location:   Right arm Right arm   Patient Position:   Lying Lying   Pulse: 75 85 86 72   Resp: (!) 31 (!) 30 (!) 31 20   Temp:  97.5 °F (36.4  °C) 97.5 °F (36.4 °C) 98.6 °F (37 °C)   TempSrc:  Oral Oral Oral   SpO2: 96% 97% 96% 99%   Weight:       Height:         General: In no acute distress, afebrile  Respiratory: Clear to auscultation bilaterally  Cardiovascular: S1, S2, no appreciable murmur  Abdomen: Soft, nontender, BS +  MSK: Warm, no lower extremity edema, no clubbing or cyanosis  Neurologic: Alert and oriented x4, moving all extremities with good strength     Lab Results   Component Value Date     03/08/2023    K 4.3 03/08/2023    CO2 27 03/08/2023    BUN 33.7 (H) 03/08/2023    CREATININE 3.10 (H) 03/08/2023    CALCIUM 8.6 (L) 03/08/2023    EGFRNONAA 52 08/06/2022      Lab Results   Component Value Date    ALT 14 03/08/2023    AST 17 03/08/2023    ALKPHOS 63 03/08/2023    BILITOT 0.5 03/08/2023      Lab Results   Component Value Date    WBC 13.5 (H) 03/08/2023    HGB 9.3 (L) 03/08/2023    HCT 30.0 (L) 03/08/2023    MCV 90.9 03/08/2023     03/08/2023      Medications:   cefTRIAXone (ROCEPHIN) IVPB  1 g Intravenous Q24H    enoxaparin  30 mg Subcutaneous Daily      sodium chloride, dextrose 10%, dextrose 10%, glucagon (human recombinant), glucose, glucose, insulin aspart U-100, melatonin, sodium chloride 0.9%     Assessment/Plan:    UTI- POA  KEITH on CKD III  Irregular masslike lesion at the base of the urinary bladder on the left  Acute on chronic anemia s/p blood transfusion  h/o prostate cancer    Hx: O2 dependant COPD, PAD, ALEXUS s/p CEA, ACD, T2DM, HTN, HLD,       Plan:  - USG reviewed. Will get CT abdomen and pelvis. Consult urology if indicated  - Continue IV fluids, encourage PO intake. Check prealbumin, consult nutrition. Check FENa.   - Continue ceftriaxone, follow cultures until finalized  - Hb improved after transfusion. Monitor while inpatient. Complete anemia work up  - Will review and renew other apt home meds. Hold bp meds. Hold flomax due to borderline low BP. Reintroduce as tolerated    PT  Lovenox 30      Ziyad SANTOS  MD Gonzalez

## 2023-03-08 NOTE — PLAN OF CARE
03/08/23 1516   Discharge Assessment   Assessment Type Discharge Planning Assessment   Source of Information health record   Communicated VEL with patient/caregiver Date not available/Unable to determine   Reason For Admission Abnormal labs   People in Home facility resident   Facility Arrived From: [facility]   Do you expect to return to your current living situation? Yes   Do you have help at home or someone to help you manage your care at home? Yes   Who are your caregiver(s) and their phone number(s)? Patient resides at [facility]   Who is going to help you get home at discharge? NH will provide transport   How do you get to doctors appointments? other (see comments)  (Nursing Home)   Are you on dialysis? No   Do you take coumadin? No   Discharge Plan A Return to nursing home   Discharge Plan B Return to Nursing Home   Discharge Barriers Identified None   OTHER   Name(s) of People in Home Patient resides at [facility]

## 2023-03-08 NOTE — PLAN OF CARE
Problem: Occupational Therapy  Goal: Occupational Therapy Goal  Description: Goals to be met by: 3/22/2023     Patient will increase functional independence with ADLs by performing:    Grooming while seated at sink with Modified [place].  Toileting from bedside commode with Moderate Assistance for hygiene and clothing management.   Toilet transfer to bedside commode with Minimal Assistance.    Outcome: Ongoing, Progressing

## 2023-03-08 NOTE — H&P
[facility] Hospital Medicine History & Physical Examination       Patient Name: Prasanth Garcia  MRN: [id number]  Patient Class: IP- Inpatient   Admission Date: 3/7/2023  5:18 PM  Length of Stay: 0  Admitting Service: Hospital Medicine   Attending Physician: Sushant Coates MD   Primary Care Provider: Mindy Lowe MD  History source: EMR, patient and/or patient's family    CHIEF COMPLAINT   Abnoraml Labs (Patient sent from [facility] for abnormal labs, BUN, WBC, hx of Dementia)    HISTORY OF PRESENT ILLNESS:   87-year-old male with a history of advanced COPD, oxygen dependent, carotid artery disease, peripheral arterial disease, chronic kidney disease stage unknown, carotid artery stenosis status post CEA, type 2 diabetes mellitus, hypertension, prostate cancer and prior GI bleeding from AVMs who was brought to the ER for abnormal labs from his nursing home [facility] for abnormal outpatient laboratory work (no further details available).      He arrived to the ER afebrile, hemodynamically stable maintaining adequate sats on his presumed baseline 3 L nasal cannula.  Laboratory work showed a mild leukocytosis of 13 K, mildly acute worsening of chronic anemia (baseline hemoglobin around 9 currently at 7.9), acute kidney injury on chronic kidney disease (baseline creatinine around 1.5 currently 3.5) and a urinalysis with evidence of urinary tract infection.  Chest x-ray showed no acute process.  Stool was reported to be brown and heme-negative in the ER.  Hospitalist was consulted for admission for further management.    PAST MEDICAL HISTORY:   Advanced COPD on chronic oxygen   Chronic kidney disease stage III  Peripheral arterial disease   Carotid artery disease status post CEA   Chronic anemia  Type 2 diabetes mellitus  Hypertension  Prostate cancer    PAST SURGICAL HISTORY:   Carotid endarterectomy  Peripheral bypass    ALLERGIES:   Patient has no known  allergies.    FAMILY HISTORY:   Reviewed and non-contributory     SOCIAL HISTORY:     Social History     Tobacco Use    Smoking status: Former     Years: 60.00     Types: Cigarettes    Smokeless tobacco: Never   Substance Use Topics    Alcohol use: No        HOME MEDICATIONS:     amLODIPine (NORVASC) 5 MG tablet    aspirin-calcium carbonate 81 mg-300 mg calcium(777 mg) Tab Take by mouth.   atorvastatin (LIPITOR) 80 MG tablet    carvedilol (COREG) 3.125 MG tablet Take by mouth.   metFORMIN (GLUCOPHAGE) 500 MG tablet    omega 3-dha-epa-fish oil (FISH OIL) 100-160-1,000 mg Cap Take 1,000 mg by mouth.   pantoprazole (PROTONIX) 40 MG tablet Take by mouth.   pantoprazole (PROTONIX) 40 MG tablet        REVIEW OF SYSTEMS:   Except as documented, all other systems reviewed and negative     PHYSICAL EXAM:   T 97.8 °F (36.6 °C)   /80   P 76   RR (!) 22   O2 (!) 93 %  GENERAL: awake, confused and in no acute distress, non-toxic appearing   HEENT: normocephalic atraumatic   NECK: supple   LUNGS:  Coarse bilaterally, no accessory muscle use   CVS: Regular rate and rhythm, normal peripheral perfusion  ABD: Soft, non-tender, non-distended, bowel sounds present  EXTREMITIES: no clubbing or cyanosis  SKIN: Warm, dry.   NEURO: alert and confused, grossly without focal deficits   PSYCHIATRIC: Cooperative    LABS AND IMAGING:     Recent Labs     03/07/23  0930   WBC 13.3*   RBC 2.80*   HGB 7.9*   HCT 25.6*   MCV 91.4   MCH 28.2   MCHC 30.9*   RDW 15.4        No results for input(s): LACTIC in the last 72 hours.  No results for input(s): INR, APTT, D-DIMER in the last 72 hours.  No results for input(s): HGBA1C, CHOL, TRIG, LDL, VLDL, HDL in the last 72 hours.   Recent Labs     03/07/23  0930      K 4.0   CHLORIDE 106   CO2 26   BUN 44.6*   CREATININE 3.42*   GLUCOSE 138*   CALCIUM 8.4*   ALBUMIN 2.2*   GLOBULIN 2.6   ALKPHOS 65   ALT 12   AST 17   BILITOT 0.4     No results for input(s): BNP, CPK, TROPONINI in the last  72 hours.       X-Ray Chest 1 View  Narrative: EXAMINATION:  XR CHEST 1 VIEW    CLINICAL HISTORY:  Shortness of breath    TECHNIQUE:  One view    COMPARISON:  March 1, 2020..    FINDINGS:  Cardiopericardial silhouette is within normal limits.  Upper chest is obscured by the mandible.  Chronic appearing interstitial changes of lungs without dense focal or segmental consolidation, congestive process, pleural effusions or pneumothorax.  Impression: NO ACUTE CARDIOPULMONARY PROCESS IDENTIFIED.    Electronically signed by: Ke Garcia  Date:    03/07/2023  Time:    22:04      ASSESSMENT & PLAN:   Urinary tract infection, present on arrival  Acute on chronic kidney disease stage III  Acute on chronic anemia  Advanced COPD on chronic oxygen   Chronic kidney disease stage unknown   Peripheral arterial disease   Carotid artery disease status post CEA   Chronic anemia  Type 2 diabetes mellitus  Hypertension  Prostate cancer    -gentle IV fluids   -urine culture and start ceftriaxone  -was transfuse 1 unit in the ER, hold off further transfusions   -renal ultrasound, strict in's and out's, urine electrolytes and CPK  -awaiting [facility], nursing home did not send a MAR     DVT prophylaxis: lovenox  Code status: full (awaiting NH information for confirmation)     If patient was admitted under observational status it is with my approval/permission.     At least 55 min was spent on this history and physical.  Time seen: 11PM 3/7/23  Sushant Coates MD

## 2023-03-08 NOTE — NURSING
Nurses Note -- 4 Eyes      3/8/2023   9:32 AM      Skin assessed during: Admit      [x] No Pressure Injuries Present    []Prevention Measures Documented      [] Yes- Altered Skin Integrity Present or Discovered   [] LDA Added if Not in Epic (Describe Wound)   [] New Altered Skin Integrity was Present on Admit and Documented in LDA   [] Wound Image Taken    Wound Care Consulted? No    Attending Nurse:  Hailey Giordano RN     Second RN/Staff Member:  hiren Gonsalves

## 2023-03-09 LAB
ALBUMIN SERPL-MCNC: 2.2 G/DL (ref 3.4–4.8)
ALBUMIN/GLOB SERPL: 0.8 RATIO (ref 1.1–2)
ALP SERPL-CCNC: 68 UNIT/L (ref 40–150)
ALT SERPL-CCNC: 16 UNIT/L (ref 0–55)
AST SERPL-CCNC: 21 UNIT/L (ref 5–34)
BASOPHILS # BLD AUTO: 0.04 X10(3)/MCL (ref 0–0.2)
BASOPHILS NFR BLD AUTO: 0.3 %
BILIRUBIN DIRECT+TOT PNL SERPL-MCNC: 0.3 MG/DL
BUN SERPL-MCNC: 28.5 MG/DL (ref 8.4–25.7)
CALCIUM SERPL-MCNC: 8.5 MG/DL (ref 8.8–10)
CHLORIDE SERPL-SCNC: 109 MMOL/L (ref 98–107)
CO2 SERPL-SCNC: 24 MMOL/L (ref 23–31)
CREAT SERPL-MCNC: 2.76 MG/DL (ref 0.73–1.18)
EOSINOPHIL # BLD AUTO: 0.56 X10(3)/MCL (ref 0–0.9)
EOSINOPHIL NFR BLD AUTO: 4.1 %
ERYTHROCYTE [DISTWIDTH] IN BLOOD BY AUTOMATED COUNT: 15.7 % (ref 11.5–17)
EST. AVERAGE GLUCOSE BLD GHB EST-MCNC: 145.6 MG/DL
FERRITIN SERPL-MCNC: 193.11 NG/ML (ref 21.81–274.66)
FOLATE SERPL-MCNC: 10 NG/ML (ref 7–31.4)
GFR SERPLBLD CREATININE-BSD FMLA CKD-EPI: 22 MLS/MIN/1.73/M2
GLOBULIN SER-MCNC: 2.6 GM/DL (ref 2.4–3.5)
GLUCOSE SERPL-MCNC: 118 MG/DL (ref 82–115)
HBA1C MFR BLD: 6.7 %
HCT VFR BLD AUTO: 30 % (ref 42–52)
HGB BLD-MCNC: 9.1 G/DL (ref 14–18)
IMM GRANULOCYTES # BLD AUTO: 0.11 X10(3)/MCL (ref 0–0.04)
IMM GRANULOCYTES NFR BLD AUTO: 0.8 %
IRON SATN MFR SERPL: 14 % (ref 20–50)
IRON SERPL-MCNC: 21 UG/DL (ref 65–175)
LYMPHOCYTES # BLD AUTO: 1.31 X10(3)/MCL (ref 0.6–4.6)
LYMPHOCYTES NFR BLD AUTO: 9.6 %
MAGNESIUM SERPL-MCNC: 1.8 MG/DL (ref 1.6–2.6)
MCH RBC QN AUTO: 27.7 PG
MCHC RBC AUTO-ENTMCNC: 30.3 G/DL (ref 33–36)
MCV RBC AUTO: 91.2 FL (ref 80–94)
MONOCYTES # BLD AUTO: 0.81 X10(3)/MCL (ref 0.1–1.3)
MONOCYTES NFR BLD AUTO: 5.9 %
NEUTROPHILS # BLD AUTO: 10.82 X10(3)/MCL (ref 2.1–9.2)
NEUTROPHILS NFR BLD AUTO: 79.3 %
NRBC BLD AUTO-RTO: 0 %
PHOSPHATE SERPL-MCNC: 3.7 MG/DL (ref 2.3–4.7)
PLATELET # BLD AUTO: 280 X10(3)/MCL (ref 130–400)
PMV BLD AUTO: 11.2 FL (ref 7.4–10.4)
POCT GLUCOSE: 143 MG/DL (ref 70–110)
POCT GLUCOSE: 170 MG/DL (ref 70–110)
POTASSIUM SERPL-SCNC: 3.9 MMOL/L (ref 3.5–5.1)
PREALB SERPL-MCNC: 7 MG/DL (ref 16–42)
PROT SERPL-MCNC: 4.8 GM/DL (ref 5.8–7.6)
PTH-INTACT SERPL-MCNC: 69.2 PG/ML (ref 8.7–77)
RBC # BLD AUTO: 3.29 X10(6)/MCL (ref 4.7–6.1)
SODIUM SERPL-SCNC: 142 MMOL/L (ref 136–145)
TIBC SERPL-MCNC: 131 UG/DL (ref 69–240)
TIBC SERPL-MCNC: 152 UG/DL (ref 250–450)
VIT B12 SERPL-MCNC: 559 PG/ML (ref 213–816)
WBC # SPEC AUTO: 13.7 X10(3)/MCL (ref 4.5–11.5)

## 2023-03-09 PROCEDURE — 83036 HEMOGLOBIN GLYCOSYLATED A1C: CPT | Performed by: INTERNAL MEDICINE

## 2023-03-09 PROCEDURE — 25000003 PHARM REV CODE 250: Performed by: INTERNAL MEDICINE

## 2023-03-09 PROCEDURE — 84100 ASSAY OF PHOSPHORUS: CPT | Performed by: INTERNAL MEDICINE

## 2023-03-09 PROCEDURE — 21400001 HC TELEMETRY ROOM

## 2023-03-09 PROCEDURE — 80053 COMPREHEN METABOLIC PANEL: CPT | Performed by: INTERNAL MEDICINE

## 2023-03-09 PROCEDURE — 82728 ASSAY OF FERRITIN: CPT | Performed by: INTERNAL MEDICINE

## 2023-03-09 PROCEDURE — 83970 ASSAY OF PARATHORMONE: CPT | Performed by: INTERNAL MEDICINE

## 2023-03-09 PROCEDURE — 84134 ASSAY OF PREALBUMIN: CPT | Performed by: INTERNAL MEDICINE

## 2023-03-09 PROCEDURE — 82607 VITAMIN B-12: CPT | Performed by: INTERNAL MEDICINE

## 2023-03-09 PROCEDURE — 63600175 PHARM REV CODE 636 W HCPCS: Performed by: INTERNAL MEDICINE

## 2023-03-09 PROCEDURE — 97535 SELF CARE MNGMENT TRAINING: CPT

## 2023-03-09 PROCEDURE — 85025 COMPLETE CBC W/AUTO DIFF WBC: CPT | Performed by: INTERNAL MEDICINE

## 2023-03-09 PROCEDURE — 97162 PT EVAL MOD COMPLEX 30 MIN: CPT

## 2023-03-09 PROCEDURE — 83550 IRON BINDING TEST: CPT | Performed by: INTERNAL MEDICINE

## 2023-03-09 PROCEDURE — 82746 ASSAY OF FOLIC ACID SERUM: CPT | Performed by: INTERNAL MEDICINE

## 2023-03-09 PROCEDURE — 83735 ASSAY OF MAGNESIUM: CPT | Performed by: INTERNAL MEDICINE

## 2023-03-09 RX ORDER — AMLODIPINE BESYLATE 5 MG/1
5 TABLET ORAL DAILY
Status: DISCONTINUED | OUTPATIENT
Start: 2023-03-10 | End: 2023-03-14 | Stop reason: HOSPADM

## 2023-03-09 RX ORDER — PANTOPRAZOLE SODIUM 40 MG/1
40 TABLET, DELAYED RELEASE ORAL DAILY
Status: DISCONTINUED | OUTPATIENT
Start: 2023-03-10 | End: 2023-03-09

## 2023-03-09 RX ORDER — NAPROXEN SODIUM 220 MG/1
81 TABLET, FILM COATED ORAL DAILY
Status: DISCONTINUED | OUTPATIENT
Start: 2023-03-10 | End: 2023-03-09

## 2023-03-09 RX ORDER — AMPICILLIN 500 MG/1
500 CAPSULE ORAL EVERY 6 HOURS
Status: DISCONTINUED | OUTPATIENT
Start: 2023-03-09 | End: 2023-03-10

## 2023-03-09 RX ORDER — CARVEDILOL 3.12 MG/1
3.12 TABLET ORAL 2 TIMES DAILY
Status: DISCONTINUED | OUTPATIENT
Start: 2023-03-09 | End: 2023-03-14 | Stop reason: HOSPADM

## 2023-03-09 RX ORDER — CIPROFLOXACIN 250 MG/1
250 TABLET, FILM COATED ORAL EVERY 12 HOURS
Status: DISCONTINUED | OUTPATIENT
Start: 2023-03-09 | End: 2023-03-09

## 2023-03-09 RX ORDER — TAMSULOSIN HYDROCHLORIDE 0.4 MG/1
0.4 CAPSULE ORAL DAILY
Status: DISCONTINUED | OUTPATIENT
Start: 2023-03-10 | End: 2023-03-14 | Stop reason: HOSPADM

## 2023-03-09 RX ADMIN — PANTOPRAZOLE SODIUM 40 MG: 40 TABLET, DELAYED RELEASE ORAL at 08:03

## 2023-03-09 RX ADMIN — AMPICILLIN 500 MG: 500 CAPSULE ORAL at 05:03

## 2023-03-09 RX ADMIN — FERROUS SULFATE TAB 325 MG (65 MG ELEMENTAL FE) 1 EACH: 325 (65 FE) TAB at 09:03

## 2023-03-09 RX ADMIN — SODIUM CHLORIDE: 9 INJECTION, SOLUTION INTRAVENOUS at 05:03

## 2023-03-09 RX ADMIN — ENOXAPARIN SODIUM 30 MG: 30 INJECTION SUBCUTANEOUS at 04:03

## 2023-03-09 RX ADMIN — CARVEDILOL 3.12 MG: 3.12 TABLET, FILM COATED ORAL at 09:03

## 2023-03-09 RX ADMIN — FINASTERIDE 5 MG: 5 TABLET, FILM COATED ORAL at 08:03

## 2023-03-09 RX ADMIN — ATORVASTATIN CALCIUM 40 MG: 40 TABLET, FILM COATED ORAL at 09:03

## 2023-03-09 RX ADMIN — ASPIRIN 81 MG: 81 TABLET, COATED ORAL at 08:03

## 2023-03-09 RX ADMIN — FERROUS SULFATE TAB 325 MG (65 MG ELEMENTAL FE) 1 EACH: 325 (65 FE) TAB at 08:03

## 2023-03-09 RX ADMIN — THERA TABS 1 TABLET: TAB at 08:03

## 2023-03-09 RX ADMIN — CEFTRIAXONE SODIUM 1 G: 1 INJECTION, POWDER, FOR SOLUTION INTRAMUSCULAR; INTRAVENOUS at 04:03

## 2023-03-09 RX ADMIN — AMPICILLIN 500 MG: 500 CAPSULE ORAL at 11:03

## 2023-03-09 NOTE — PLAN OF CARE
Problem: Physical Therapy  Goal: Physical Therapy Goal  Description: Goals to be met by: 23     Patient will increase functional independence with mobility by performin. Supine to sit with [place]  2. Sit to supine with [place]  3. Sit to stand transfer with Minimal Assistance  4. Bed to chair transfer with Minimal Assistance using Rolling Walker  5. Gait  x 100 feet with Contact Guard Assistance using Rolling Walker.     Outcome: Ongoing, Progressing

## 2023-03-09 NOTE — PT/OT/SLP PROGRESS
Occupational Therapy   Treatment    Name: Prasanth Garcia  MRN: [id number]  Admitting Diagnosis:  Acute kidney injury superimposed on chronic kidney disease       Recommendations:     Discharge Recommendations: nursing facility, skilled  Discharge Equipment Recommendations:   (TBD next level of care)  Barriers to discharge:  None    Assessment:     Prasanth Garcia is a 87 y.o. male with a medical diagnosis of Acute kidney injury superimposed on chronic kidney disease, irregular mass-like lesion at base of urinary bladder. Patient has a history of COPD. Performance deficits affecting function are weakness, impaired endurance, impaired balance, gait instability, impaired self care skills, impaired functional mobility, decreased upper extremity function, decreased safety awareness, impaired cognition.     Rehab Prognosis:  Good; patient would benefit from acute skilled OT services to address these deficits and reach maximum level of function.       Plan:     Patient to be seen 3 x/week, 5 x/week to address the above listed problems via self-care/home management, therapeutic activities, therapeutic exercises  Plan of Care Expires: 03/22/23  Plan of Care Reviewed with: patient, family    Subjective     Pain/Comfort:  Pain Rating 1: 0/10    Objective:     Communicated with: nrsg prior to session.  Patient found right sidelying with telemetry, oxygen, pulse ox (continuous) upon OT entry to room.    General Precautions: Standard, fall    Orthopedic Precautions:N/A  Braces: N/A  Respiratory Status: Nasal cannula, flow 3 L/min  SPO2 99% at rest; 80% with activity with slow increase to 85%-while seated up in chair  Return to 95% SPO2 when returned to supine     Occupational Performance:     Bed Mobility:    Patient completed Supine to Sit with minimum assistance  Patient completed Sit to Supine with minimum assistance     Functional Mobility/Transfers:  Patient completed Sit <> Stand Transfer with moderate assistance  with  rolling  walker   Patient completed Bed <> Chair Transfer using Step Transfer technique with minimum assistance with rolling walker  Functional Mobility: Patient ambulated 4ft with min a for management of RW. Patient is impulsive with mobility.     Activities of Daily Living:  Feeding:  moderate assistance  up in chair.  Patient is capable of feeding self with set-up of food when encouragement is provided; however, he is self-limiting.   Toileting: total assistance bed level following bowel accident; patient aware of urge to have BM but unable to hold for long enough to transfer to toilet.     Patient left HOB elevated with all lines intact, call button in reach, and bed alarm on    GOALS:   Multidisciplinary Problems       Occupational Therapy Goals          Problem: Occupational Therapy    Goal Priority Disciplines Outcome Interventions   Occupational Therapy Goal     OT, PT/OT Ongoing, Progressing    Description: Goals to be met by: 3/22/2023     Patient will increase functional independence with ADLs by performing:    Grooming while seated at sink with Modified [place].  Toileting from bedside commode with Moderate Assistance for hygiene and clothing management.   Toilet transfer to bedside commode with Minimal Assistance.                         Time Tracking:     OT Date of Treatment: 03/09/23  OT Start Time: 1426  OT Stop Time: 1504  OT Total Time (min): 38 min    Billable Minutes:Self Care/Home Management 38 min    OT/GERRY: OT     GERRY Visit Number: 1    3/9/2023

## 2023-03-09 NOTE — PROGRESS NOTES
[facility]  Hospital Medicine Progress Note        Chief Complaint: Inpatient Follow-up    HPI:   87-year-old male with significant history of advanced COPD, chronic kidney disease stage 3, PAD, carotid artery disease status post CEA, anemia of chronic disease, type 2 diabetes mellitus, HTN, prostate cancer.  Patient also has history of GI bleed secondary to AVMs. patient is a nursing home resident and was sent to the ED for further evaluation as far as abnormal outpatient lab work.  patient was afebrile, hemodynamically stable in the ED.  Lab significant for leukocytosis, worsening anemia, acute on chronic kidney disease.  UA suggestive of UTI.  Chest x-ray negative.  Patient was admitted hospitalist medicine service.  Initiated appropriate treatment for UTI and also IV fluids for KEITH on CKD.  Hemoglobin improved post transfusion.  No overt bleeding.    Interval Hx:   Patient seen at bedside.  Comfortably laying in bed.  No acute events overnight.  No new complaints .  no overt bleeding.  He is afebrile with stable hemodynamics.    Objective/physical exam:  General: In no acute distress, afebrile  Chest: Clear to auscultation bilaterally  Heart: S1, S2, no appreciable murmur  Abdomen: Soft, nontender, BS +  MSK: Warm, no lower extremity edema, no clubbing or cyanosis  Neurologic:  Alert, awake  VITAL SIGNS: 24 HRS MIN & MAX LAST   Temp  Min: 97.4 °F (36.3 °C)  Max: 98.3 °F (36.8 °C) 97.4 °F (36.3 °C)   BP  Min: 110/52  Max: 151/69 113/63     Pulse  Min: 70  Max: 79  70   Resp  Min: 18  Max: 20 18   SpO2  Min: 93 %  Max: 99 % 99 %         Recent Labs   Lab 03/09/23  0340   WBC 13.7*   RBC 3.29*   HGB 9.1*   HCT 30.0*   MCV 91.2   MCH 27.7   MCHC 30.3*   RDW 15.7      MPV 11.2*           Recent Labs   Lab 03/09/23  0340      K 3.9   CO2 24   BUN 28.5*   CREATININE 2.76*   CALCIUM 8.5*   MG 1.80   ALBUMIN 2.2*   ALKPHOS 68   ALT 16   AST 21   BILITOT 0.3             Microbiology Results (last 7 days)       Procedure Component Value Units Date/Time    Urine culture [[id number]] Collected: 03/08/23 0002    Order Status: Resulted Specimen: Urine Updated: 03/08/23 0046             Scheduled Med:   aspirin  81 mg Oral Daily    atorvastatin  40 mg Oral QHS    cefTRIAXone (ROCEPHIN) IVPB  1 g Intravenous Q24H    enoxaparin  30 mg Subcutaneous Daily    ergocalciferol  50,000 Units Oral Q7 Days    ferrous sulfate  1 tablet Oral BID    finasteride  5 mg Oral Daily    multivitamin  1 tablet Oral Daily    pantoprazole  40 mg Oral Daily          Assessment/Plan:    Acute bacterial UTI secondary to Enterococcus   Sepsis secondary to above  Acute on chronic normocytic anemia status post PRBC transfusion on admit-improved   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  Ureteral obstruction secondary to above  COPD with no acute exacerbation  Acute on chronic kidney disease stage 3-improving  History of PVD  Carotid artery disease status post CEA   Type 2 diabetes mellitus  Essential HTN-stable   History of prostate cancer  Prophylaxis    I will switch and antibiotics to ampicillin based on culture and sensitivity   Only less than 50 K colonies, but still will do a short course of antibiotics given ureteral obstruction/complicated UTI  Hemoglobin improved post transfusion   No overt bleeding   Renal function recovering on IV fluids  Continue the same  A CT abdomen/pelvis was ordered to further evaluate bladder mass  Concerning for bladder mass with local advancement/ureteral obstruction   Consulted Urology   Await recommendations  Avoid nephrotoxins  Continue home meds-amlodipine, aspirin, statin, Coreg, vitamin-D, oral iron, finasteride, bronchodilators, multivitamin, ppi, tamsulosin  DVT prophylaxis-Ronnell Masters MD   03/09/2023

## 2023-03-09 NOTE — PROGRESS NOTES
Inpatient Nutrition Assessment    Admit Date: 3/7/2023   Total duration of encounter: 2 days     Nutrition Recommendation/Prescription     Continue oral diet as tolerated.  Add Boost Glucose Control (provides 250 kcal, 14 g protein per serving).  Encouraged intake.    Communication of Recommendations: reviewed with nurse    Nutrition Assessment     Malnutrition Assessment/Nutrition-Focused Physical Exam    Malnutrition in the context of chronic illness  Degree of Malnutrition: non-severe (moderate) malnutrition  Energy Intake: unable to obtain  Interpretation of Weight Loss: unable to obtain  Body Fat: mild depletion  Area of Body Fat Loss: orbital region  and upper arm region - triceps / biceps  Muscle Mass Loss: mild depletion  Area of Muscle Mass Loss: temple region - temporalis muscle  Fluid Accumulation: unable to obtain  Edema: unable to obtain  Reduced  Strength: unable to obtain  A minimum of two characteristics is recommended for diagnosis of either severe or non-severe malnutrition.    Chart Review    Reason Seen: continuous nutrition monitoring and physician consult for malnutrition    Malnutrition Screening Tool Results   Have you recently lost weight without trying?: No  Have you been eating poorly because of a decreased appetite?: No   MST Score: 0     Diagnosis:  UTI - POA  KEITH on CKD III  Irregular masslike lesion at the base of the urinary bladder on the left  Acute on chronic anemia s/p blood transfusion    Relevant Medical History: advanced COPD, oxygen dependent, carotid artery disease, peripheral arterial disease, chronic kidney disease stage unknown, carotid artery stenosis status post CEA, type 2 diabetes mellitus, hypertension, prostate cancer and prior GI bleeding from AVMs    Nutrition-Related Medications: NS, ergocalciferol, ferrous sulfate, multivitamin, pantoprazole, insulin aspart  Calorie Containing IV Medications: no significant kcals from medications at this  "time    Nutrition-Related Labs:  3/9/23 Cl 109, Glu 118, Ca 8.5, Alb 2.2, PAB 7.0    Diet/PN Order: Diet diabetic  Oral Supplement Order: none  Tube Feeding Order: none  Appetite/Oral Intake: fair/25-50% of meals  Factors Affecting Nutritional Intake: none identified  Food/Yazidism/Cultural Preferences: unable to obtain  Food Allergies: unable to obtain       Wound(s):   N/A    Comments    3/9/23 Patient sleeping during rounds, did not arouse to loud name call, untouched tray at bedside, CNA reports patient consumed 100% of breakfast, will add Boost.    Anthropometrics    Height: 6' 0.99" (185.4 cm) Height Method: Stated  Last Weight: 83.9 kg (185 lb) (03/08/23 2014) Weight Method: Stated  BMI (Calculated): 24.4  BMI Classification: normal (BMI 18.5-24.9)        Ideal Body Weight (IBW), Male: 183.94 lb     % Ideal Body Weight, Male (lb): 100.58 %                          Usual Weight Provided By: unable to obtain usual weight and patient denies unintentional weight loss (per MST)    Wt Readings from Last 5 Encounters:   03/08/23 83.9 kg (185 lb)   08/28/22 90.7 kg (200 lb)   06/02/20 81.5 kg (179 lb 10.8 oz)   09/13/18 84.4 kg (186 lb)     Weight Change(s) Since Admission: stable  Wt Readings from Last 1 Encounters:   03/08/23 2014 83.9 kg (185 lb)   03/07/23 1708 83.9 kg (185 lb)     Admit Weight: 83.9 kg (185 lb) (03/07/23 1708)    Estimated Needs    Weight Used For Calorie Calculations: 83.9 kg (184 lb 15.5 oz)  Energy Calorie Requirements (kcal): 1888, 1.2 stress factor  Energy Need Method: [place]-St Jeor  Weight Used For Protein Calculations: 83.9 kg (184 lb 15.5 oz)  Protein Requirements:  g, 1.2-1.4 g/kg  Fluid Requirements (mL): 1888, 1 ml/kcal  Temp: 98 °F (36.7 °C)       Enteral Nutrition Patient not receiving enteral nutrition at this time.    Parenteral Nutrition Patient not receiving parenteral nutrition support at this time.    Evaluation of Received Nutrient Intake    Calories: not meeting " estimated needs  Protein: not meeting estimated needs    Patient Education Not applicable.    Nutrition Diagnosis     PES: Inadequate energy intake related to inadequate oral intake as evidenced by <80% needs met. (new)  PES: Malnutrition related to multiple chronic conditions including advanced COPD as evidenced by mild fat depletion and mild muscle depletion. (new)     Interventions/Goals     Intervention(s): general/healthful diet, commercial beverage, and collaboration with other providers  Goal: Meet greater than 75% of nutritional needs by follow-up. (new)    Monitoring & Evaluation     Dietitian will monitor food and beverage intake.  Nutrition Risk/Follow-Up: moderate (follow-up in 3-5 days)   Please consult if re-assessment needed sooner.

## 2023-03-09 NOTE — PLAN OF CARE
Patient is long term resident at [facility]. Clinical updates sent.   Patient refused overdue vaccines at this time.

## 2023-03-09 NOTE — PLAN OF CARE
Problem: Adult Inpatient Plan of Care  Goal: Plan of Care Review  Outcome: Ongoing, Progressing  Goal: Patient-Specific Goal (Individualized)  Outcome: Ongoing, Progressing  Goal: Absence of Hospital-Acquired Illness or Injury  Outcome: Ongoing, Progressing  Goal: Optimal Comfort and Wellbeing  Outcome: Ongoing, Progressing  Goal: Readiness for Transition of Care  Outcome: Ongoing, Progressing     Problem: Diabetes Comorbidity  Goal: Blood Glucose Level Within Targeted Range  Outcome: Ongoing, Progressing     Problem: Fluid and Electrolyte Imbalance (Acute Kidney Injury/Impairment)  Goal: Fluid and Electrolyte Balance  Outcome: Ongoing, Progressing     Problem: Oral Intake Inadequate (Acute Kidney Injury/Impairment)  Goal: Optimal Nutrition Intake  Outcome: Ongoing, Progressing     Problem: Renal Function Impairment (Acute Kidney Injury/Impairment)  Goal: Effective Renal Function  Outcome: Ongoing, Progressing     Problem: Skin Injury Risk Increased  Goal: Skin Health and Integrity  Outcome: Ongoing, Progressing     Problem: Fall Injury Risk  Goal: Absence of Fall and Fall-Related Injury  Outcome: Ongoing, Progressing

## 2023-03-09 NOTE — PT/OT/SLP EVAL
Physical Therapy Evaluation    Patient Name:  Prasanth Garcia   MRN:  [id number]    Recommendations:     Discharge Recommendations: nursing facility, skilled   Discharge Equipment Recommendations:  (TBD by next level of care)   Barriers to discharge:  acuity of illness    Assessment:     Prasanth Garcia is a 87 y.o. male admitted with a medical diagnosis of Acute kidney injury superimposed on chronic kidney disease. Patient is a NH resident. He requires assistance with ADL's and spends much of his time in his wheelchair. He presents with the following impairments/functional limitations: weakness, impaired endurance, impaired self care skills, impaired functional mobility, gait instability, impaired balance, decreased safety awareness, impaired cardiopulmonary response to activity. He required MIN A for bed mobility. MOD A for sit<>stand. Ambulated 5 ft with RW & CGA. Poor safety awareness. Limited by SOB. Plan is for return to NH. Progress patient as tolerated.     Rehab Prognosis: Good; patient would benefit from acute skilled PT services to address these deficits and reach maximum level of function.    Recent Surgery: * No surgery found *      Plan:     During this hospitalization, patient to be seen 3 x/week (3-5x/week) to address the identified rehab impairments via therapeutic activities, therapeutic exercises, gait training, neuromuscular re-education and progress toward the following goals:    Plan of Care Expires:  04/09/23    Subjective     Chief Complaint: SOB  Patient/Family Comments/goals: none  Pain/Comfort:  Pain Rating 1: 0/10    Patients cultural, spiritual, Confucianism conflicts given the current situation: no    Living Environment:  NH resident  Prior to admission, patient required assistance with ADL's and mobility.  Equipment used at home: wheelchair.  DME owned (not currently used): none.  Upon discharge, patient will have assistance from NH staff.    Objective:     Communicated with nurse prior to  session.  Patient found supine with telemetry, oxygen  upon PT entry to room.    General Precautions: Standard, fall  Orthopedic Precautions:N/A   Braces: N/A  Respiratory Status: Nasal cannula, flow 2 L/min. Initial sat reading was 98%. Patient desatted to 80% while ambulating. Bumped oxygen to 4L/min and sats slowly improved to mid 90's with seated rest. Patient satting at 98% when PT exited room.     Exams:  Cognitive Exam:  Patient is oriented to Person, Place, Time, and Situation  Sensation:    -       Intact  RLE ROM: WFL  RLE Strength: -4/5 grossly  LLE ROM: WFL  LLE Strength: -4/5 grossly    Functional Mobility:  Bed Mobility:     Supine to Sit: minimum assistance  Sit to Supine: minimum assistance  Transfers:     Sit to Stand:  moderate assistance with rolling walker  Gait: 5 ft with RW & CGA. Poor safety awareness. Limited by SOB  Balance: poor      AM-PAC 6 CLICK MOBILITY  Total Score:13     Patient left right sidelying with all lines intact, call button in reach, and family present.    GOALS:   Multidisciplinary Problems       Physical Therapy Goals          Problem: Physical Therapy    Goal Priority Disciplines Outcome Goal Variances Interventions   Physical Therapy Goal     PT, PT/OT Ongoing, Progressing     Description: Goals to be met by: 23     Patient will increase functional independence with mobility by performin. Supine to sit with [place]  2. Sit to supine with [place]  3. Sit to stand transfer with Minimal Assistance  4. Bed to chair transfer with Minimal Assistance using Rolling Walker  5. Gait  x 100 feet with Contact Guard Assistance using Rolling Walker.                          History:     Past Medical History:   Diagnosis Date    Diabetes mellitus     Hypertension 2018    Pulmonary asbestosis 2018       No past surgical history on file.    Time Tracking:     PT Received On: 23  PT Start Time: 830     PT Stop Time: 852  PT Total Time (min): 22 min      Billable Minutes: Evaluation 22 minutes      03/09/2023

## 2023-03-10 LAB
ALBUMIN SERPL-MCNC: 2.2 G/DL (ref 3.4–4.8)
ALBUMIN/GLOB SERPL: 0.8 RATIO (ref 1.1–2)
ALP SERPL-CCNC: 66 UNIT/L (ref 40–150)
ALT SERPL-CCNC: 16 UNIT/L (ref 0–55)
AST SERPL-CCNC: 16 UNIT/L (ref 5–34)
BASOPHILS # BLD AUTO: 0.03 X10(3)/MCL (ref 0–0.2)
BASOPHILS NFR BLD AUTO: 0.2 %
BILIRUBIN DIRECT+TOT PNL SERPL-MCNC: 0.4 MG/DL
BNP BLD-MCNC: 196.4 PG/ML
BUN SERPL-MCNC: 24.1 MG/DL (ref 8.4–25.7)
CALCIUM SERPL-MCNC: 8.3 MG/DL (ref 8.8–10)
CHLORIDE SERPL-SCNC: 111 MMOL/L (ref 98–107)
CO2 SERPL-SCNC: 24 MMOL/L (ref 23–31)
CREAT SERPL-MCNC: 2.25 MG/DL (ref 0.73–1.18)
D DIMER PPP IA.FEU-MCNC: 0.88 UG/ML FEU (ref 0–0.5)
EOSINOPHIL # BLD AUTO: 0.63 X10(3)/MCL (ref 0–0.9)
EOSINOPHIL NFR BLD AUTO: 4.4 %
ERYTHROCYTE [DISTWIDTH] IN BLOOD BY AUTOMATED COUNT: 15.8 % (ref 11.5–17)
GFR SERPLBLD CREATININE-BSD FMLA CKD-EPI: 28 MLS/MIN/1.73/M2
GLOBULIN SER-MCNC: 2.7 GM/DL (ref 2.4–3.5)
GLUCOSE SERPL-MCNC: 113 MG/DL (ref 82–115)
HCT VFR BLD AUTO: 29.6 % (ref 42–52)
HGB BLD-MCNC: 9.1 G/DL (ref 14–18)
IMM GRANULOCYTES # BLD AUTO: 0.09 X10(3)/MCL (ref 0–0.04)
IMM GRANULOCYTES NFR BLD AUTO: 0.6 %
LYMPHOCYTES # BLD AUTO: 1.41 X10(3)/MCL (ref 0.6–4.6)
LYMPHOCYTES NFR BLD AUTO: 9.8 %
MCH RBC QN AUTO: 28 PG
MCHC RBC AUTO-ENTMCNC: 30.7 G/DL (ref 33–36)
MCV RBC AUTO: 91.1 FL (ref 80–94)
MONOCYTES # BLD AUTO: 0.87 X10(3)/MCL (ref 0.1–1.3)
MONOCYTES NFR BLD AUTO: 6.1 %
NEUTROPHILS # BLD AUTO: 11.33 X10(3)/MCL (ref 2.1–9.2)
NEUTROPHILS NFR BLD AUTO: 78.9 %
NRBC BLD AUTO-RTO: 0 %
PLATELET # BLD AUTO: 284 X10(3)/MCL (ref 130–400)
PMV BLD AUTO: 10.8 FL (ref 7.4–10.4)
POCT GLUCOSE: 147 MG/DL (ref 70–110)
POCT GLUCOSE: 159 MG/DL (ref 70–110)
POTASSIUM SERPL-SCNC: 4 MMOL/L (ref 3.5–5.1)
PROT SERPL-MCNC: 4.9 GM/DL (ref 5.8–7.6)
RBC # BLD AUTO: 3.25 X10(6)/MCL (ref 4.7–6.1)
SODIUM SERPL-SCNC: 143 MMOL/L (ref 136–145)
WBC # SPEC AUTO: 14.4 X10(3)/MCL (ref 4.5–11.5)

## 2023-03-10 PROCEDURE — 25000003 PHARM REV CODE 250: Performed by: INTERNAL MEDICINE

## 2023-03-10 PROCEDURE — 97530 THERAPEUTIC ACTIVITIES: CPT | Mod: CQ

## 2023-03-10 PROCEDURE — 27000221 HC OXYGEN, UP TO 24 HOURS

## 2023-03-10 PROCEDURE — 94640 AIRWAY INHALATION TREATMENT: CPT

## 2023-03-10 PROCEDURE — 63600175 PHARM REV CODE 636 W HCPCS: Performed by: INTERNAL MEDICINE

## 2023-03-10 PROCEDURE — 80053 COMPREHEN METABOLIC PANEL: CPT | Performed by: INTERNAL MEDICINE

## 2023-03-10 PROCEDURE — 25000242 PHARM REV CODE 250 ALT 637 W/ HCPCS: Performed by: INTERNAL MEDICINE

## 2023-03-10 PROCEDURE — 83880 ASSAY OF NATRIURETIC PEPTIDE: CPT | Performed by: INTERNAL MEDICINE

## 2023-03-10 PROCEDURE — 85379 FIBRIN DEGRADATION QUANT: CPT | Performed by: INTERNAL MEDICINE

## 2023-03-10 PROCEDURE — 21400001 HC TELEMETRY ROOM

## 2023-03-10 PROCEDURE — 97530 THERAPEUTIC ACTIVITIES: CPT | Mod: CO

## 2023-03-10 PROCEDURE — 85025 COMPLETE CBC W/AUTO DIFF WBC: CPT | Performed by: INTERNAL MEDICINE

## 2023-03-10 RX ORDER — SODIUM CHLORIDE 9 MG/ML
INJECTION, SOLUTION INTRAVENOUS CONTINUOUS
Status: ACTIVE | OUTPATIENT
Start: 2023-03-10 | End: 2023-03-11

## 2023-03-10 RX ORDER — IPRATROPIUM BROMIDE AND ALBUTEROL SULFATE 2.5; .5 MG/3ML; MG/3ML
3 SOLUTION RESPIRATORY (INHALATION) EVERY 4 HOURS PRN
Status: DISCONTINUED | OUTPATIENT
Start: 2023-03-10 | End: 2023-03-12

## 2023-03-10 RX ADMIN — TAMSULOSIN HYDROCHLORIDE 0.4 MG: 0.4 CAPSULE ORAL at 08:03

## 2023-03-10 RX ADMIN — ENOXAPARIN SODIUM 30 MG: 30 INJECTION SUBCUTANEOUS at 05:03

## 2023-03-10 RX ADMIN — IPRATROPIUM BROMIDE AND ALBUTEROL SULFATE 3 ML: 2.5; .5 SOLUTION RESPIRATORY (INHALATION) at 07:03

## 2023-03-10 RX ADMIN — FINASTERIDE 5 MG: 5 TABLET, FILM COATED ORAL at 08:03

## 2023-03-10 RX ADMIN — SODIUM CHLORIDE: 9 INJECTION, SOLUTION INTRAVENOUS at 02:03

## 2023-03-10 RX ADMIN — FLUTICASONE FUROATE, UMECLIDINIUM BROMIDE AND VILANTEROL TRIFENATATE 1 PUFF: 100; 62.5; 25 POWDER RESPIRATORY (INHALATION) at 09:03

## 2023-03-10 RX ADMIN — CEFTRIAXONE SODIUM 1 G: 1 INJECTION, POWDER, FOR SOLUTION INTRAMUSCULAR; INTRAVENOUS at 02:03

## 2023-03-10 RX ADMIN — AMPICILLIN 500 MG: 500 CAPSULE ORAL at 05:03

## 2023-03-10 RX ADMIN — CARVEDILOL 3.12 MG: 3.12 TABLET, FILM COATED ORAL at 08:03

## 2023-03-10 RX ADMIN — AMPICILLIN 500 MG: 500 CAPSULE ORAL at 12:03

## 2023-03-10 RX ADMIN — FERROUS SULFATE TAB 325 MG (65 MG ELEMENTAL FE) 1 EACH: 325 (65 FE) TAB at 08:03

## 2023-03-10 RX ADMIN — PANTOPRAZOLE SODIUM 40 MG: 40 TABLET, DELAYED RELEASE ORAL at 08:03

## 2023-03-10 RX ADMIN — CARVEDILOL 3.12 MG: 3.12 TABLET, FILM COATED ORAL at 09:03

## 2023-03-10 RX ADMIN — THERA TABS 1 TABLET: TAB at 08:03

## 2023-03-10 RX ADMIN — FERROUS SULFATE TAB 325 MG (65 MG ELEMENTAL FE) 1 EACH: 325 (65 FE) TAB at 09:03

## 2023-03-10 RX ADMIN — ATORVASTATIN CALCIUM 40 MG: 40 TABLET, FILM COATED ORAL at 09:03

## 2023-03-10 RX ADMIN — ASPIRIN 81 MG: 81 TABLET, COATED ORAL at 08:03

## 2023-03-10 NOTE — PLAN OF CARE
Problem: Adult Inpatient Plan of Care  Goal: Plan of Care Review  Outcome: Ongoing, Progressing  Flowsheets (Taken 3/10/2023 1609)  Plan of Care Reviewed With: patient  Goal: Patient-Specific Goal (Individualized)  Outcome: Ongoing, Progressing  Goal: Absence of Hospital-Acquired Illness or Injury  Outcome: Ongoing, Progressing  Intervention: Identify and Manage Fall Risk  Flowsheets (Taken 3/10/2023 1609)  Safety Promotion/Fall Prevention:   assistive device/personal item within reach   medications reviewed   high risk medications identified   nonskid shoes/socks when out of bed   Fall Risk reviewed with patient/family  Intervention: Prevent Skin Injury  Flowsheets (Taken 3/10/2023 1609)  Body Position: position changed independently  Skin Protection:   adhesive use limited   tubing/devices free from skin contact  Goal: Optimal Comfort and Wellbeing  Outcome: Ongoing, Progressing  Intervention: Monitor Pain and Promote Comfort  Flowsheets (Taken 3/10/2023 1609)  Pain Management Interventions:   care clustered   quiet environment facilitated  Intervention: Provide Person-Centered Care  Flowsheets (Taken 3/10/2023 1609)  Trust Relationship/Rapport:   care explained   questions answered   questions encouraged   reassurance provided     Problem: Diabetes Comorbidity  Goal: Blood Glucose Level Within Targeted Range  Outcome: Ongoing, Progressing  Intervention: Monitor and Manage Glycemia  Flowsheets (Taken 3/10/2023 1609)  Glycemic Management: blood glucose monitored     Problem: Fluid and Electrolyte Imbalance (Acute Kidney Injury/Impairment)  Goal: Fluid and Electrolyte Balance  Outcome: Ongoing, Progressing  Intervention: Monitor and Manage Fluid and Electrolyte Balance  Flowsheets (Taken 3/10/2023 1609)  Fluid/Electrolyte Management: fluids provided     Problem: Oral Intake Inadequate (Acute Kidney Injury/Impairment)  Goal: Optimal Nutrition Intake  Outcome: Ongoing, Progressing     Problem: Renal Function  Impairment (Acute Kidney Injury/Impairment)  Goal: Effective Renal Function  Outcome: Ongoing, Progressing     Problem: Skin Injury Risk Increased  Goal: Skin Health and Integrity  Outcome: Ongoing, Progressing     Problem: Fall Injury Risk  Goal: Absence of Fall and Fall-Related Injury  Outcome: Ongoing, Progressing  Intervention: Identify and Manage Contributors  Flowsheets (Taken 3/10/2023 1609)  Self-Care Promotion:   independence encouraged   BADL personal objects within reach  Intervention: Promote Injury-Free Environment  Flowsheets (Taken 3/10/2023 1609)  Safety Promotion/Fall Prevention:   assistive device/personal item within reach   medications reviewed   high risk medications identified   nonskid shoes/socks when out of bed   Fall Risk reviewed with patient/family

## 2023-03-10 NOTE — PT/OT/SLP PROGRESS
Occupational Therapy  Treatment    Prasanth Garcia   MRN: [id number]   Admitting Diagnosis: Acute kidney injury superimposed on chronic kidney disease    OT Date of Treatment: 03/10/23   OT Start Time: 1105  OT Stop Time: 1130  OT Total Time (min): 25 min     Billable Minutes:  Therapeutic Activity 25  Total Minutes: 25     OT/GERRY: GERRY     GERRY Visit Number: 2    General Precautions: Standard, fall  Orthopedic Precautions: N/A  Braces: N/A    Spiritual, Cultural Beliefs, Confucianism Practices, Values that Affect Care: no    Subjective:  Communicated with nurse prior to session.    Objective:  Patient found with: telemetry, pulse ox (continuous), oxygen    Functional Mobility:  Bed Mobility:   Supine to sit: Minimal Assistance   Sit to supine: Moderate Assistance   Rolling: Minimal Assistance   Scooting: Minimal Assistance    Treatment:   Transfer bed to bedside chair with Mod A with RW. O2 94 before transfer, /55. After getting setup in chair O2 decreased to 85%, instructed on pursed lip breathing, nurse increased O2, O2 sat continued to decreased to 75%. Patient returned to bed with head of bed elevated. O2 returned to 95% after several minutes.    Additional Treatment:  Patient demonstrated decreased ax tolerance    Patient left HOB elevated with all lines intact and call button in reach    ASSESSMENT:  Prasanth Garcia is a 87 y.o. male with a medical diagnosis of Acute kidney injury superimposed on chronic kidney disease and presents with decreased ax tolerance, decreased ADL skills, decreased mobility and decreased ADL skills.    Rehab potential is good    Activity tolerance: Good    Discharge recommendations: nursing facility, skilled     Equipment recommendations:       GOALS:   Multidisciplinary Problems       Occupational Therapy Goals          Problem: Occupational Therapy    Goal Priority Disciplines Outcome Interventions   Occupational Therapy Goal     OT, PT/OT Ongoing, Progressing    Description: Goals to be  met by: 3/22/2023     Patient will increase functional independence with ADLs by performing:    Grooming while seated at sink with Modified [place].  Toileting from bedside commode with Moderate Assistance for hygiene and clothing management.   Toilet transfer to bedside commode with Minimal Assistance.                         Plan:  Patient to be seen 3 x/week, 5 x/week to address the above listed problems via self-care/home management, therapeutic activities, therapeutic exercises  Plan of Care expires: 03/22/23  Plan of Care reviewed with: patient    03/10/2023

## 2023-03-10 NOTE — PROGRESS NOTES
[facility]  Hospital Medicine Progress Note        Chief Complaint: Inpatient Follow-up    HPI:   87-year-old male with significant history of advanced COPD, chronic kidney disease stage 3, PAD, carotid artery disease status post CEA, anemia of chronic disease, type 2 diabetes mellitus, HTN, prostate cancer.  Patient also has history of GI bleed secondary to AVMs. patient is a nursing home resident and was sent to the ED for further evaluation as far as abnormal outpatient lab work.  patient was afebrile, hemodynamically stable in the ED.  Lab significant for leukocytosis, worsening anemia, acute on chronic kidney disease.  UA suggestive of UTI.  Chest x-ray negative.  Patient was admitted hospitalist medicine service.  Initiated appropriate treatment for UTI and also IV fluids for KEIHT on CKD.  Hemoglobin improved post transfusion.  No overt bleeding.  Renal function slowly improving on IV fluids which was continued.  CT abdomen/pelvis was ordered on 03/09 which confirmed bladder mass with concerns for local versus distant metastatic d/s.  Urology consulted.  CT thorax ordered to rule out metastatic disease in chest.    Interval Hx:   Patient seen at bedside.  He is having intermittent hypoxemic spells and with saturations are dropping to 80s.  But recovers.  Hemodynamics otherwise stable.  Patient is awake and alert, but does have baseline confusion secondary to dementia.    Objective/physical exam:  General: In no acute distress, afebrile  Chest: Clear to auscultation bilaterally  Heart: S1, S2, no appreciable murmur  Abdomen: Soft, nontender, BS +  MSK: Warm, no lower extremity edema, no clubbing or cyanosis  Neurologic:  Alert, awake  VITAL SIGNS: 24 HRS MIN & MAX LAST   Temp  Min: 97.4 °F (36.3 °C)  Max: 99.8 °F (37.7 °C) 98.8 °F (37.1 °C)   BP  Min: 102/48  Max: 127/75 (!) 102/48     Pulse  Min: 70  Max: 115  88   Resp  Min: 18  Max: 20 18   SpO2  Min: 94 %  Max: 100 % (!) 94 %          Recent Labs   Lab 03/09/23  0340   WBC 13.7*   RBC 3.29*   HGB 9.1*   HCT 30.0*   MCV 91.2   MCH 27.7   MCHC 30.3*   RDW 15.7      MPV 11.2*         Recent Labs   Lab 03/09/23  0340      K 3.9   CO2 24   BUN 28.5*   CREATININE 2.76*   CALCIUM 8.5*   MG 1.80   ALBUMIN 2.2*   ALKPHOS 68   ALT 16   AST 21   BILITOT 0.3          Microbiology Results (last 7 days)       Procedure Component Value Units Date/Time    Urine culture [[id number]]  (Abnormal) Collected: 03/08/23 0002    Order Status: Completed Specimen: Urine Updated: 03/10/23 0635     Urine Culture No other significant growth      10,000 - 25,000 colonies/ml Gram-negative Rods             Scheduled Med:   amLODIPine  5 mg Oral Daily    ampicillin  500 mg Oral Q6H    aspirin  81 mg Oral Daily    atorvastatin  40 mg Oral QHS    carvediloL  3.125 mg Oral BID    enoxaparin  30 mg Subcutaneous Daily    ergocalciferol  50,000 Units Oral Q7 Days    ferrous sulfate  1 tablet Oral BID    finasteride  5 mg Oral Daily    fluticasone-umeclidin-vilanter  1 puff Inhalation QHS    multivitamin  1 tablet Oral Daily    pantoprazole  40 mg Oral Daily    tamsulosin  0.4 mg Oral Daily          Assessment/Plan:    Acute bacterial UTI secondary to Gram-negative rods  Sepsis secondary to above  Acute on chronic normocytic anemia status post PRBC transfusion on admit-improved   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  Ureteral obstruction secondary to above  Intermittent hypoxemia  COPD with no acute exacerbation  Acute on chronic kidney disease stage 3-improving  History of PVD  Carotid artery disease status post CEA   Type 2 diabetes mellitus  Essential HTN-stable   History of prostate cancer  Prophylaxis    Most recent urine cultures-Gram-negative rods, final speciation is pending   Patient is on ampicillin given his previous urine cultures growing Enterococcus  I will DC ampicillin and switch him back to ceftriaxone  WBC count is  slightly worse today   Continue to monitor   Only less than 25 K colonies  However still treating given complicated UTI  Hemoglobin improved post transfusion   No overt bleeding   Renal function recovering on IV fluids  I have decrease normal saline to 50 cc/hour  A CT abdomen/pelvis was ordered on 03/09 to further evaluate bladder mass  Concerning for bladder mass with local advancement/ureteral obstruction   Consulted Urology on 03/09, await recommendations   CT thorax with no distant metastatic d/s  Avoid nephrotoxins  Patient also having intermittent hypoxemic spells in with saturations drops to 80s   Patient has severe COPD/emphysema which could be the reason   Chest imaging with no volume overload   I will obtain D-dimer, if positive will rule out PE/DVT  Continue home meds-amlodipine, aspirin, statin, Coreg, vitamin-D, oral iron, finasteride, bronchodilators, multivitamin, ppi, tamsulosin  DVT prophylaxis-Lovenox    Labs reviewed personally.  Hemoglobin improved, more than 9 today   Renal function recovering with creatinine trending down to 2.25 today    Maria Guadalupe Masters MD   03/10/2023

## 2023-03-10 NOTE — CONSULTS
Prasanth Garcia 1935  [id number]  3/10/2023    CONSULTING PHYSICIAN: Maria Guadalupe Masters MD    Reason for consultation: bladder mass    HPI:  The patient is an 87-year-old male with a history of advanced COPD on home O2, CAD, PAD, chronic kidney disease, carotid artery artery stenosis status post CEA, diabetes mellitus, hypertension, prostate cancer, dementia and GI bleed secondary to AVM who was brought to the emergency room from a nursing home on 03/07/2023 due to abnormal outpatient lab work.  Lab work on arrival reveals white blood cell 13.3 (14.4 today), H&H 7.9/25.6, BUN/creatinine 40.4/3.55 (24.1/2.25); UA with turbid yellow urine, 50-99 red blood cell, greater than 100 WBC, moderate bacteria, negative nitrite, 20-30 squamous epithelial cells.  Urine culture from 3/8 with 10,000-25,000 gram-negative rods (recent UC on 2/23 with enterococcus).  Retroperitoneal ultrasound from 03/08/2023 reveals irregular masslike lesion at the base of the urinary bladder on the left with mild left hydronephrosis.  CT abdomen and pelvis performed yesterday reveals expansile, somewhat infiltrative appearing left posterolateral bladder wall mass with partial ureteral obstruction with moderate left hydroureteronephrosis, lobulated masslike lesion between the bladder and the anterior rectal wall suspicious for local metastasis and possible direct rectal wall invasion, metastatic right perirectal lymph node.    He is afebrile. Creatinine continues to improve with fluids. He is on rocephin. He continues with urinary incontinence, unchanged from baseline. He denies suprapubic discomfort/bladder distention. He denies any hematuria or dysuria. He follows with Dr. Zavala for incontinence and h/o prostate cancer.He is an ex-smoker, quit about >5 years ago, smoked for 30 years. No family currently at bedside.    Past Medical History:   Diagnosis Date    Diabetes mellitus     Hypertension 9/13/2018    Pulmonary asbestosis 9/14/2018     No past  surgical history on file.    Family History   Problem Relation Age of Onset    Cancer Father      Social History     Tobacco Use    Smoking status: Former     Years: 60.00     Types: Cigarettes    Smokeless tobacco: Never   Substance Use Topics    Alcohol use: No    Drug use: No     Current Facility-Administered Medications   Medication Dose Route Frequency Provider Last Rate Last Admin    0.9%  NaCl infusion (for blood administration)   Intravenous Q24H PRN Kami Cedeno MD   Stopped at 03/08/23 0310    albuterol-ipratropium 2.5 mg-0.5 mg/3 mL nebulizer solution 3 mL  3 mL Nebulization Q4H PRN Sue Segovia MD   3 mL at 03/10/23 0732    amLODIPine tablet 5 mg  5 mg Oral Daily Maria Guadalupe Masters MD        ampicillin 500 MG capsule 500 mg  500 mg Oral Q6H Maria Guadalupe Masters MD   500 mg at 03/10/23 0543    aspirin EC tablet 81 mg  81 mg Oral Daily Ziyad Roth MD   81 mg at 03/10/23 0853    atorvastatin tablet 40 mg  40 mg Oral QHS Ziyad Roth MD   40 mg at 03/09/23 2100    carvediloL tablet 3.125 mg  3.125 mg Oral BID Maria Guadalupe Masters MD   3.125 mg at 03/10/23 0851    dextrose 10% bolus 125 mL 125 mL  12.5 g Intravenous PRN Sushant Coates MD        dextrose 10% bolus 250 mL 250 mL  25 g Intravenous PRN Sushant Coates MD        enoxaparin injection 30 mg  30 mg Subcutaneous Daily Sushant Coates MD   30 mg at 03/09/23 1648    ergocalciferol capsule 50,000 Units  50,000 Units Oral Q7 Days Ziyad Roth MD   50,000 Units at 03/08/23 1244    ferrous sulfate tablet 1 each  1 tablet Oral BID Ziyad Roth MD   1 each at 03/10/23 0852    finasteride tablet 5 mg  5 mg Oral Daily Ziyad Roth MD   5 mg at 03/10/23 0852    fluticasone-umeclidin-vilanter 100-62.5-25 mcg DsDv 1 puff  1 puff Inhalation QHS Maria Guadalupe Masters MD        glucagon (human recombinant) injection 1 mg  1 mg Intramuscular PRN Sushant Coates MD        glucose chewable tablet 16 g  16 g Oral PRN Sushant PEREZ  MD Jaxon        glucose chewable tablet 24 g  24 g Oral PRN Sushant Coates MD        insulin aspart U-100 injection 0-5 Units  0-5 Units Subcutaneous QID (AC + HS) PRN Sushant Coates MD   2 Units at 03/08/23 1733    melatonin tablet 6 mg  6 mg Oral Nightly PRN Sushant Coates MD   6 mg at 03/08/23 2103    multivitamin tablet  1 tablet Oral Daily Ziyad Roth MD   1 tablet at 03/10/23 0853    pantoprazole EC tablet 40 mg  40 mg Oral Daily Ziyad Roth MD   40 mg at 03/10/23 0853    sodium chloride 0.9% flush 10 mL  10 mL Intravenous PRN Sushant Coates MD        tamsulosin 24 hr capsule 0.4 mg  0.4 mg Oral Daily Maria Guadalupe Masters MD   0.4 mg at 03/10/23 0853     Review of patient's allergies indicates:  No Known Allergies    ROS: 12 point review of systems negative other than the HPI    PHYSICAL EXAM:  Vitals:    03/10/23 0435 03/10/23 0728 03/10/23 0732 03/10/23 0851   BP: 125/89 (!) 102/48  106/62   Pulse: 93 73 88 90   Resp: 18 20 18    Temp: 98.6 °F (37 °C) 98.8 °F (37.1 °C)     TempSrc: Oral Oral     SpO2:  (!) 94%     Weight:       Height:             Intake/Output Summary (Last 24 hours) at 3/10/2023 0923  Last data filed at 3/10/2023 0914  Gross per 24 hour   Intake 240 ml   Output --   Net 240 ml     GEN: NAD  HEENT: NCAT, PERRLA, EOMI, OP clear, nares patent  CV: RRR  RESP: Even and unlabored  ABD: soft, NTND  : No urine available for assessment; No bladder distention   EXT: no C/C/E  NEURO: no focal deficits, awake and alert    LABS:  Recent Results (from the past 24 hour(s))   POCT glucose    Collection Time: 03/09/23 11:12 AM   Result Value Ref Range    POCT Glucose 170 (H) 70 - 110 mg/dL   POCT glucose    Collection Time: 03/09/23  3:42 PM   Result Value Ref Range    POCT Glucose 143 (H) 70 - 110 mg/dL   POCT glucose    Collection Time: 03/09/23  9:06 PM   Result Value Ref Range    POCT Glucose 147 (H) 70 - 110 mg/dL   Comprehensive Metabolic Panel    Collection  Time: 03/10/23  6:56 AM   Result Value Ref Range    Sodium Level 143 136 - 145 mmol/L    Potassium Level 4.0 3.5 - 5.1 mmol/L    Chloride 111 (H) 98 - 107 mmol/L    Carbon Dioxide 24 23 - 31 mmol/L    Glucose Level 113 82 - 115 mg/dL    Blood Urea Nitrogen 24.1 8.4 - 25.7 mg/dL    Creatinine 2.25 (H) 0.73 - 1.18 mg/dL    Calcium Level Total 8.3 (L) 8.8 - 10.0 mg/dL    Protein Total 4.9 (L) 5.8 - 7.6 gm/dL    Albumin Level 2.2 (L) 3.4 - 4.8 g/dL    Globulin 2.7 2.4 - 3.5 gm/dL    Albumin/Globulin Ratio 0.8 (L) 1.1 - 2.0 ratio    Bilirubin Total 0.4 <=1.5 mg/dL    Alkaline Phosphatase 66 40 - 150 unit/L    Alanine Aminotransferase 16 0 - 55 unit/L    Aspartate Aminotransferase 16 5 - 34 unit/L    eGFR 28 mls/min/1.73/m2   CBC with Differential    Collection Time: 03/10/23  6:56 AM   Result Value Ref Range    WBC 14.4 (H) 4.5 - 11.5 x10(3)/mcL    RBC 3.25 (L) 4.70 - 6.10 x10(6)/mcL    Hgb 9.1 (L) 14.0 - 18.0 g/dL    Hct 29.6 (L) 42.0 - 52.0 %    MCV 91.1 80.0 - 94.0 fL    MCH 28.0 pg    MCHC 30.7 (L) 33.0 - 36.0 g/dL    RDW 15.8 11.5 - 17.0 %    Platelet 284 130 - 400 x10(3)/mcL    MPV 10.8 (H) 7.4 - 10.4 fL    Neut % 78.9 %    Lymph % 9.8 %    Mono % 6.1 %    Eos % 4.4 %    Basophil % 0.2 %    Lymph # 1.41 0.6 - 4.6 x10(3)/mcL    Neut # 11.33 (H) 2.1 - 9.2 x10(3)/mcL    Mono # 0.87 0.1 - 1.3 x10(3)/mcL    Eos # 0.63 0 - 0.9 x10(3)/mcL    Baso # 0.03 0 - 0.2 x10(3)/mcL    IG# 0.09 (H) 0 - 0.04 x10(3)/mcL    IG% 0.6 %    NRBC% 0.0 %   BNP    Collection Time: 03/10/23  6:56 AM   Result Value Ref Range    Natriuretic Peptide 196.4 (H) <=100.0 pg/mL       IMAGING:  CT Abdomen Pelvis Without Contrast [[id number]] (Abnormal) Resulted: 03/09/23 0939   Order Status: Completed Updated: 03/09/23 0941   Narrative:     EXAMINATION   CT ABDOMEN PELVIS WITHOUT CONTRAST     CLINICAL HISTORY   BLADDER MASS;     TECHNIQUE   Non-contrast helical-acquisition CT images were obtained and multiplanar reformats accomplished by a CT  technologist at a separate workstation, pushed to PACS for physician review.     Enteric contrast: none utilized     COMPARISON   None available at the time of initial interpretation.     FINDINGS   Images were reviewed in soft tissue, lung, and bone windows.     Exam quality: Inherently limited evaluation of the abdominopelvic organs and vasculature secondary to non-contrast protocol.     Lines/tubes: none visualized     Chronic bilateral lung base parenchymal changes are noted, with no evidence of organized consolidation or other suspicious focal abnormality.  There is no significant pleural fluid.  Cardiac chambers appear moderately dilated.  No pericardial effusion is identified.  Widespread coronary and aortoiliac calcification is present, as well as calcific plaque throughout the bilateral iliofemoral arterial vessels.  There is curvilinear calcific intraluminal density along the right anterolateral infrarenal aorta, which could represent eccentric calcified plaque versus element of intimal flap otherwise poorly assessed by non-contrast protocol.     The gallbladder and bile ducts are normal in appearance.  No suspicious focal abnormality of the liver, pancreas, or spleen.  There is no adrenal nodule.     No expansile mass-like renal lesion or complex cyst is identified.  Moderate left hydroureteronephrosis is noted, secondary to obstructing infiltrative appearing mass of the urinary bladder left lateral and posterior walls.  No right obstructive uropathy is identified.  Evaluation of the bladder mass is otherwise limited in the absence of cystogram technique.  Scattered brachytherapy beads are present through the prostate.     The included lower esophagus and the stomach are nondilated.  No evidence of high-grade mechanical bowel obstruction or focal gastrointestinal lesion.  The appendix is unremarkable.  Moderate volume retained fecal material is present throughout the course of the colon.  There is notable  burden of descending and sigmoid diverticulosis without CT findings of acute diverticulitis.  Nonspecific disorganized left paracolic gutter fluid is present, with no loculated or drainable collection identified.  There is no free intra-abdominal air.     Ovoid soft tissue mass-like density measuring 3.9 cm x 2.9 cm is appreciated between the posterior bladder wall and the anterior rectal contour, with no distinct interposing fat planes identified (series 2, image 75).  Further characterization is otherwise limited by non-contrast CT protocol.  Additionally, there is a spherical enlarged right perirectal lymph node measuring 2.4 cm x 2.4 cm (series 2, image 68).  No other definite pathologic gilles enlargement or necrotic adenopathy is identified by CT assessment.     There is extensive heterogeneity of the regional osseous structures and widespread spinal column and bony pelvis degenerative changes.  Chronic appearing posttraumatic changes of the right acetabulum with incomplete fracture fusion along the medial acetabular contour also incidentally appreciated.  There is nonacute mild anterior compression deformity of L2.  No definite acute cortical displacement or destructive osseous lesion is identified.  Small, uncomplicated fat containing bilateral inguinal hernias are present.  No acute abnormality of the regional body wall subcutaneous tissues and musculature identified.     IMPRESSION   1. Expansile, somewhat infiltrative appearing left posterolateral bladder wall mass with associated at least partial ureteral obstruction.  Additional characterization is otherwise limited in the absence of CT cystogram technique.   2. Lobulated mass-like lesion between the bladder and anterior rectal wall is of limited characterization by non-contrast CT protocol, with suspicion for local metastasis and potential direct rectal wall invasion.   3. Metastatic right perirectal lymph node, with no definite CT evidence of more  distant metastasis.   4. Curvilinear intraluminal calcific density of the infrarenal abdominal aorta is incompletely evaluated by non-contrast protocol, differential includes eccentric calcified plaque versus calcified intimal flap.   5. Additional nonacute secondary details discussed above.   ==========     This report was flagged in Epic as abnormal.     RADIATION DOSE   Automated tube current modulation, weight-based exposure dosing, and/or iterative reconstruction technique utilized to reach lowest reasonably achievable exposure rate.     DLP: 325 mGy*cm       Electronically signed by: Alonzo Quinn   Date: 03/09/2023   Time: 09:39   US Retroperitoneal Complete [[id number]] Resulted: 03/08/23 0822   Order Status: Completed Updated: 03/08/23 0824   Narrative:     EXAMINATION:   US RETROPERITONEAL COMPLETE     CLINICAL HISTORY:   elevated BUN/Cr;     TECHNIQUE:   Ultrasound of the kidneys and urinary bladder was performed including color flow and grayscale evaluation of the kidneys.     COMPARISON:   CT pelvis 03/01/2020     FINDINGS:   RIGHT KIDNEY: The right kidney measures 10 cm.  Portions of the lower pole are obscured by shadowing.  No hydronephrosis.  No cystic renal mass.     LEFT KIDNEY: The left kidney measures 11.2 cm.  Mild left hydronephrosis.  Incidental 3.2 cm right renal cyst. No follow-up imaging is recommended per consensus recommendations based on imaging criteria.     BLADDER:  Irregular, echogenic, 4 cm masslike lesion at the bladder on the left inferiorly with posterior acoustic shadowing.  There is debris layering dependently within the bladder lumen.     OTHER: Aorta, common iliac arteries and IVC obscured by shadowing.    Impression:       1. Irregular masslike lesion at the base of the urinary bladder on the left.   2. Mild left hydronephrosis.  Unable to follow the ureter on sonogram though presence of a left-sided bladder mass suggest potential obstruction at the left ureterovesical  junction       Electronically signed by: Tg Lew   Date: 03/08/2023   Time: 08:22         ASSESSMENT:  -Left posterolateral bladder mass vs extension of known prostate cancer with partial ureteral obstruction, moderate left hydroureteronephrosis and possible rectal wall invasion  -UTI - UC with GNR, on ampicillin  -CKD with KEITH - improving (baseline cr reportedly around 1.5)    PLAN:  Discussed with Dr. Lopez. Patient needs to complete a course of antibiotics for his infection prior to any type of intervention. Plan is for diagnostic cystoscopy with TURBT once his infection is treated. This can be performed on an outpatient basis.     Yasmeen Ellis, [facility]

## 2023-03-10 NOTE — PT/OT/SLP PROGRESS
Physical Therapy Treatment    Patient Name:  Prasanth Garcia   MRN:  [id number]    Recommendations:     Discharge Recommendations: nursing facility, skilled  Discharge Equipment Recommendations:  (TBD by next level of care)  Barriers to discharge: None    Assessment:     Prasanth Garcia is a 87 y.o. male admitted with a medical diagnosis of Acute kidney injury superimposed on chronic kidney disease.  He presents with the following impairments/functional limitations: weakness, impaired functional mobility, impaired cognition, decreased safety awareness, impaired cardiopulmonary response to activity, impaired endurance, gait instability .    Rehab Prognosis: Good; patient would benefit from acute skilled PT services to address these deficits and reach maximum level of function.    Recent Surgery: * No surgery found *      Plan:     During this hospitalization, patient to be seen 3 x/week (3-5x/week) to address the identified rehab impairments via therapeutic activities and progress toward the following goals:    Plan of Care Expires:  04/09/23    Subjective     Chief Complaint:   Patient/Family Comments/goals:   Pain/Comfort:         Objective:     Communicated with nurse prior to session.  Patient found HOB elevated with telemetry, pulse ox (continuous), oxygen upon PT entry to room.     General Precautions: Standard, fall  Orthopedic Precautions: N/A  Braces: N/A  Respiratory Status: Nasal cannula, flow 5 L/min  Blood Pressure:   Skin Integrity:       Functional Mobility:  Bed Mobility:     Supine to Sit: moderate assistance  Sit to Supine: minimum assistance    Therapeutic Activities/Exercises:  Pt sat EOB for approx 9 minutes with BUE support. Pt with increased RR and decreased O2 saturations (between 87%-85%). Pt requested to return to supine secondary to fatigue and SOB.     Education:  Patient provided with verbal education regarding safety awareness.  Understanding was verbalized, however additional teaching  warranted.     Patient left HOB elevated with all lines intact and call button in reach..    GOALS:   Multidisciplinary Problems       Physical Therapy Goals          Problem: Physical Therapy    Goal Priority Disciplines Outcome Goal Variances Interventions   Physical Therapy Goal     PT, PT/OT Ongoing, Progressing     Description: Goals to be met by: 23     Patient will increase functional independence with mobility by performin. Supine to sit with [place]  2. Sit to supine with [place]  3. Sit to stand transfer with Minimal Assistance  4. Bed to chair transfer with Minimal Assistance using Rolling Walker  5. Gait  x 100 feet with Contact Guard Assistance using Rolling Walker.                          Time Tracking:     PT Received On: 03/10/23  PT Start Time: 1422     PT Stop Time: 1440  PT Total Time (min): 18 min     Billable Minutes: Therapeutic Activity 18    Treatment Type: Treatment  PT/PTA: PTA     PTA Visit Number: 1     03/10/2023

## 2023-03-11 LAB
ALBUMIN SERPL-MCNC: 2.1 G/DL (ref 3.4–4.8)
ALBUMIN/GLOB SERPL: 0.8 RATIO (ref 1.1–2)
ALP SERPL-CCNC: 62 UNIT/L (ref 40–150)
ALT SERPL-CCNC: 14 UNIT/L (ref 0–55)
AST SERPL-CCNC: 18 UNIT/L (ref 5–34)
BACTERIA UR CULT: ABNORMAL
BACTERIA UR CULT: ABNORMAL
BASOPHILS # BLD AUTO: 0.04 X10(3)/MCL (ref 0–0.2)
BASOPHILS NFR BLD AUTO: 0.3 %
BILIRUBIN DIRECT+TOT PNL SERPL-MCNC: 0.4 MG/DL
BUN SERPL-MCNC: 23.4 MG/DL (ref 8.4–25.7)
CALCIUM SERPL-MCNC: 8.2 MG/DL (ref 8.8–10)
CHLORIDE SERPL-SCNC: 109 MMOL/L (ref 98–107)
CO2 SERPL-SCNC: 23 MMOL/L (ref 23–31)
CREAT SERPL-MCNC: 2.13 MG/DL (ref 0.73–1.18)
EOSINOPHIL # BLD AUTO: 0.66 X10(3)/MCL (ref 0–0.9)
EOSINOPHIL NFR BLD AUTO: 4.9 %
ERYTHROCYTE [DISTWIDTH] IN BLOOD BY AUTOMATED COUNT: 15.8 % (ref 11.5–17)
GFR SERPLBLD CREATININE-BSD FMLA CKD-EPI: 30 MLS/MIN/1.73/M2
GLOBULIN SER-MCNC: 2.6 GM/DL (ref 2.4–3.5)
GLUCOSE SERPL-MCNC: 103 MG/DL (ref 82–115)
HCT VFR BLD AUTO: 26.7 % (ref 42–52)
HGB BLD-MCNC: 8.3 G/DL (ref 14–18)
IMM GRANULOCYTES # BLD AUTO: 0.09 X10(3)/MCL (ref 0–0.04)
IMM GRANULOCYTES NFR BLD AUTO: 0.7 %
LYMPHOCYTES # BLD AUTO: 1.41 X10(3)/MCL (ref 0.6–4.6)
LYMPHOCYTES NFR BLD AUTO: 10.5 %
MCH RBC QN AUTO: 28.1 PG
MCHC RBC AUTO-ENTMCNC: 31.1 G/DL (ref 33–36)
MCV RBC AUTO: 90.5 FL (ref 80–94)
MONOCYTES # BLD AUTO: 0.9 X10(3)/MCL (ref 0.1–1.3)
MONOCYTES NFR BLD AUTO: 6.7 %
NEUTROPHILS # BLD AUTO: 10.29 X10(3)/MCL (ref 2.1–9.2)
NEUTROPHILS NFR BLD AUTO: 76.9 %
NRBC BLD AUTO-RTO: 0 %
PLATELET # BLD AUTO: 267 X10(3)/MCL (ref 130–400)
PMV BLD AUTO: 10.9 FL (ref 7.4–10.4)
POCT GLUCOSE: 119 MG/DL (ref 70–110)
POCT GLUCOSE: 135 MG/DL (ref 70–110)
POCT GLUCOSE: 181 MG/DL (ref 70–110)
POCT GLUCOSE: 183 MG/DL (ref 70–110)
POTASSIUM SERPL-SCNC: 4.1 MMOL/L (ref 3.5–5.1)
PROT SERPL-MCNC: 4.7 GM/DL (ref 5.8–7.6)
PSA SERPL-MCNC: <0.1 NG/ML
RBC # BLD AUTO: 2.95 X10(6)/MCL (ref 4.7–6.1)
SODIUM SERPL-SCNC: 143 MMOL/L (ref 136–145)
WBC # SPEC AUTO: 13.4 X10(3)/MCL (ref 4.5–11.5)

## 2023-03-11 PROCEDURE — 25000242 PHARM REV CODE 250 ALT 637 W/ HCPCS: Performed by: INTERNAL MEDICINE

## 2023-03-11 PROCEDURE — 21400001 HC TELEMETRY ROOM

## 2023-03-11 PROCEDURE — 27000221 HC OXYGEN, UP TO 24 HOURS

## 2023-03-11 PROCEDURE — 94761 N-INVAS EAR/PLS OXIMETRY MLT: CPT

## 2023-03-11 PROCEDURE — 85025 COMPLETE CBC W/AUTO DIFF WBC: CPT | Performed by: INTERNAL MEDICINE

## 2023-03-11 PROCEDURE — 25000003 PHARM REV CODE 250: Performed by: INTERNAL MEDICINE

## 2023-03-11 PROCEDURE — 63600175 PHARM REV CODE 636 W HCPCS: Performed by: INTERNAL MEDICINE

## 2023-03-11 PROCEDURE — 94640 AIRWAY INHALATION TREATMENT: CPT

## 2023-03-11 PROCEDURE — 51702 INSERT TEMP BLADDER CATH: CPT

## 2023-03-11 PROCEDURE — 84153 ASSAY OF PSA TOTAL: CPT | Performed by: INTERNAL MEDICINE

## 2023-03-11 PROCEDURE — 80053 COMPREHEN METABOLIC PANEL: CPT | Performed by: INTERNAL MEDICINE

## 2023-03-11 RX ORDER — SODIUM CHLORIDE 9 MG/ML
INJECTION, SOLUTION INTRAVENOUS CONTINUOUS
Status: DISCONTINUED | OUTPATIENT
Start: 2023-03-11 | End: 2023-03-12

## 2023-03-11 RX ADMIN — PANTOPRAZOLE SODIUM 40 MG: 40 TABLET, DELAYED RELEASE ORAL at 09:03

## 2023-03-11 RX ADMIN — UMECLIDINIUM BROMIDE AND VILANTEROL TRIFENATATE 1 PUFF: 62.5; 25 POWDER RESPIRATORY (INHALATION) at 10:03

## 2023-03-11 RX ADMIN — ASPIRIN 81 MG: 81 TABLET, COATED ORAL at 09:03

## 2023-03-11 RX ADMIN — FERROUS SULFATE TAB 325 MG (65 MG ELEMENTAL FE) 1 EACH: 325 (65 FE) TAB at 09:03

## 2023-03-11 RX ADMIN — THERA TABS 1 TABLET: TAB at 09:03

## 2023-03-11 RX ADMIN — IPRATROPIUM BROMIDE AND ALBUTEROL SULFATE 3 ML: 2.5; .5 SOLUTION RESPIRATORY (INHALATION) at 08:03

## 2023-03-11 RX ADMIN — ATORVASTATIN CALCIUM 40 MG: 40 TABLET, FILM COATED ORAL at 09:03

## 2023-03-11 RX ADMIN — ENOXAPARIN SODIUM 30 MG: 30 INJECTION SUBCUTANEOUS at 04:03

## 2023-03-11 RX ADMIN — CARVEDILOL 3.12 MG: 3.12 TABLET, FILM COATED ORAL at 09:03

## 2023-03-11 RX ADMIN — CEFEPIME 1 G: 1 INJECTION, POWDER, FOR SOLUTION INTRAMUSCULAR; INTRAVENOUS at 04:03

## 2023-03-11 RX ADMIN — FLUTICASONE FUROATE 1 PUFF: 100 POWDER RESPIRATORY (INHALATION) at 10:03

## 2023-03-11 RX ADMIN — FINASTERIDE 5 MG: 5 TABLET, FILM COATED ORAL at 09:03

## 2023-03-11 RX ADMIN — SODIUM CHLORIDE: 9 INJECTION, SOLUTION INTRAVENOUS at 04:03

## 2023-03-11 RX ADMIN — TAMSULOSIN HYDROCHLORIDE 0.4 MG: 0.4 CAPSULE ORAL at 09:03

## 2023-03-11 NOTE — PROGRESS NOTES
Pharmacist Renal Dose Adjustment Note    Prasanth Garcia is a 87 y.o. male being treated with the medication cefepime    Patient Data:    Vital Signs (Most Recent):  Temp: 98.4 °F (36.9 °C) (03/11/23 1122)  Pulse: 76 (03/11/23 1122)  Resp: 16 (03/11/23 1122)  BP: 106/61 (03/11/23 1122)  SpO2: (!) 86 % (03/11/23 0006)   Vital Signs (72h Range):  Temp:  [97.4 °F (36.3 °C)-99.8 °F (37.7 °C)]   Pulse:  []   Resp:  [16-20]   BP: ()/(44-93)   SpO2:  [86 %-100 %]      Recent Labs   Lab 03/09/23  0340 03/10/23  0656 03/11/23  0408   CREATININE 2.76* 2.25* 2.13*     Serum creatinine: 2.13 mg/dL (H) 03/11/23 0408  Estimated creatinine clearance: 28.1 mL/min (A)    Cefepime 1 gm IVPB Q8H will be changed cefepime 1 gm IVPB Q24H based on patient's eCrCl ~ 28 mL/min per pharmacy protocol.    Pharmacist's Name: Selina Mcbride, PharmD  Pharmacist's Extension: [phone]

## 2023-03-11 NOTE — PROGRESS NOTES
[facility]  Hospital Medicine Progress Note        Chief Complaint: Inpatient Follow-up    HPI:   87-year-old male with significant history of advanced COPD, chronic kidney disease stage 3, PAD, carotid artery disease status post CEA, anemia of chronic disease, type 2 diabetes mellitus, HTN, prostate cancer.  Patient also has history of GI bleed secondary to AVMs. patient is a nursing home resident and was sent to the ED for further evaluation as far as abnormal outpatient lab work.  patient was afebrile, hemodynamically stable in the ED.  Lab significant for leukocytosis, worsening anemia, acute on chronic kidney disease.  UA suggestive of UTI.  Chest x-ray negative.  Patient was admitted hospitalist medicine service.  Initiated appropriate treatment for UTI and also IV fluids for KEITH on CKD.  Hemoglobin improved post transfusion.  No overt bleeding.  Renal function slowly improving on IV fluids which was continued.  CT abdomen/pelvis was ordered on 03/09 which confirmed bladder mass with concerns for local versus distant metastatic d/s.  Urology consulted.  CT thorax ordered to rule out metastatic disease in chest.  CT chest with no metastatic disease.  Awaiting urology recommendations.  Urine cultures-Gram-negative rods.  Awaiting final speciation, on ceftriaxone.  Renal function continues to improve on IV fluids-continued.  Intermittent hypoxemia most likely secondary to severe COPD/emphysema.    Interval Hx:   Patient seen at bedside.  Patient is comfortably laying in bed.  Respiratory status stable on 1-2 L with saturation mostly in 90s .  confused at baseline.  No acute events overnight.    Objective/physical exam:  General: In no acute distress, afebrile  Chest: Clear to auscultation bilaterally  Heart: S1, S2, no appreciable murmur  Abdomen: Soft, nontender, BS +  MSK: Warm, no lower extremity edema, no clubbing or cyanosis  Neurologic:  Alert, awake  VITAL SIGNS: 24 HRS MIN &  MAX LAST   Temp  Min: 98 °F (36.7 °C)  Max: 98.8 °F (37.1 °C) 98.4 °F (36.9 °C)   BP  Min: 92/50  Max: 144/61 (!) 92/50     Pulse  Min: 73  Max: 84  73   Resp  Min: 16  Max: 20 16   SpO2  Min: 86 %  Max: 99 % (!) 86 %         Recent Labs   Lab 03/11/23  0408   WBC 13.4*   RBC 2.95*   HGB 8.3*   HCT 26.7*   MCV 90.5   MCH 28.1   MCHC 31.1*   RDW 15.8      MPV 10.9*         Recent Labs   Lab 03/09/23  0340 03/10/23  0656 03/11/23  0408      < > 143   K 3.9   < > 4.1   CO2 24   < > 23   BUN 28.5*   < > 23.4   CREATININE 2.76*   < > 2.13*   CALCIUM 8.5*   < > 8.2*   MG 1.80  --   --    ALBUMIN 2.2*   < > 2.1*   ALKPHOS 68   < > 62   ALT 16   < > 14   AST 21   < > 18   BILITOT 0.3   < > 0.4    < > = values in this interval not displayed.          Microbiology Results (last 7 days)       Procedure Component Value Units Date/Time    Urine culture [[id number]]  (Abnormal)  (Susceptibility) Collected: 03/08/23 0002    Order Status: Completed Specimen: Urine Updated: 03/11/23 0821     Urine Culture No other significant growth      10,000 - 25,000 colonies/ml Proteus mirabilis ESBL     Comment: ESBL (Extended spectrum beta-lactamase)                Scheduled Med:   amLODIPine  5 mg Oral Daily    aspirin  81 mg Oral Daily    atorvastatin  40 mg Oral QHS    carvediloL  3.125 mg Oral BID    cefTRIAXone (ROCEPHIN) IVPB  1 g Intravenous Q24H    enoxaparin  30 mg Subcutaneous Daily    ergocalciferol  50,000 Units Oral Q7 Days    ferrous sulfate  1 tablet Oral BID    finasteride  5 mg Oral Daily    fluticasone-umeclidin-vilanter  1 puff Inhalation QHS    multivitamin  1 tablet Oral Daily    pantoprazole  40 mg Oral Daily    tamsulosin  0.4 mg Oral Daily          Assessment/Plan:    Acute bacterial UTI secondary to  MDR Proteus  Sepsis secondary to above  Acute on chronic normocytic anemia status post PRBC transfusion on admit-improved   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  Ureteral  obstruction secondary to above  Intermittent hypoxemia secondary to severe COPD/emphysema  Acute on chronic kidney disease stage 3-improving  History of PVD  Carotid artery disease status post CEA   Type 2 diabetes mellitus  Essential HTN-stable   History of prostate cancer  Prophylaxis    Urine cultures-Proteus  Only less than 25 K colonies, but still will treat given complicated UTI   Resistant to ceftriaxone, switched to cefepime based on culture and sensitivities  Afebrile   Leukocytosis better compared to admit  Hemoglobin had improved post transfusion , but trended down to 8.3 today  No overt bleeding   Renal function recovering on IV fluids  Continue normal saline at 60 cc/hour  A CT abdomen/pelvis was ordered on 03/09 to further evaluate bladder mass  Concerning for bladder mass with local advancement/ureteral obstruction   Urology consulted   Recommends antibiotic treatment for at least 3 days and they will perform diagnostic cysto/biopsy/TURBT as inpatient  Will hold aspirin  Urology also recommends Velázquez decompression   I have ordered Velázquez placement  CT thorax with no distant metastatic d/s  Avoid nephrotoxins  Patient also having intermittent hypoxemic spells in with saturations drops to 80s   Patient has severe COPD/emphysema which could be the reason   Chest imaging with no volume overload   D-dimer is not impressive-less than 1  Continue home meds-amlodipine,  statin, Coreg, vitamin-D, oral iron, finasteride, bronchodilators, multivitamin, ppi, tamsulosin  DVT prophylaxis-Lovenox    Labs reviewed personally.    Hemoglobin is 8.3 today   Renal function recovering with creatinine trending down to 2.13     Maria Guadalupe Masters MD   03/11/2023

## 2023-03-12 LAB
ALBUMIN SERPL-MCNC: 2.1 G/DL (ref 3.4–4.8)
ALBUMIN/GLOB SERPL: 0.8 RATIO (ref 1.1–2)
ALP SERPL-CCNC: 61 UNIT/L (ref 40–150)
ALT SERPL-CCNC: 15 UNIT/L (ref 0–55)
AST SERPL-CCNC: 18 UNIT/L (ref 5–34)
BASOPHILS # BLD AUTO: 0.02 X10(3)/MCL (ref 0–0.2)
BASOPHILS NFR BLD AUTO: 0.2 %
BILIRUBIN DIRECT+TOT PNL SERPL-MCNC: 0.5 MG/DL
BUN SERPL-MCNC: 22.7 MG/DL (ref 8.4–25.7)
CALCIUM SERPL-MCNC: 8.3 MG/DL (ref 8.8–10)
CHLORIDE SERPL-SCNC: 108 MMOL/L (ref 98–107)
CO2 SERPL-SCNC: 27 MMOL/L (ref 23–31)
CREAT SERPL-MCNC: 1.8 MG/DL (ref 0.73–1.18)
EOSINOPHIL # BLD AUTO: 0.57 X10(3)/MCL (ref 0–0.9)
EOSINOPHIL NFR BLD AUTO: 4.4 %
ERYTHROCYTE [DISTWIDTH] IN BLOOD BY AUTOMATED COUNT: 15.8 % (ref 11.5–17)
GFR SERPLBLD CREATININE-BSD FMLA CKD-EPI: 36 MLS/MIN/1.73/M2
GLOBULIN SER-MCNC: 2.6 GM/DL (ref 2.4–3.5)
GLUCOSE SERPL-MCNC: 111 MG/DL (ref 82–115)
HCT VFR BLD AUTO: 27.5 % (ref 42–52)
HGB BLD-MCNC: 8.3 G/DL (ref 14–18)
IMM GRANULOCYTES # BLD AUTO: 0.09 X10(3)/MCL (ref 0–0.04)
IMM GRANULOCYTES NFR BLD AUTO: 0.7 %
LYMPHOCYTES # BLD AUTO: 1.37 X10(3)/MCL (ref 0.6–4.6)
LYMPHOCYTES NFR BLD AUTO: 10.6 %
MCH RBC QN AUTO: 27.8 PG
MCHC RBC AUTO-ENTMCNC: 30.2 G/DL (ref 33–36)
MCV RBC AUTO: 92 FL (ref 80–94)
MONOCYTES # BLD AUTO: 0.81 X10(3)/MCL (ref 0.1–1.3)
MONOCYTES NFR BLD AUTO: 6.3 %
NEUTROPHILS # BLD AUTO: 10.03 X10(3)/MCL (ref 2.1–9.2)
NEUTROPHILS NFR BLD AUTO: 77.8 %
NRBC BLD AUTO-RTO: 0 %
PLATELET # BLD AUTO: 273 X10(3)/MCL (ref 130–400)
PMV BLD AUTO: 10.3 FL (ref 7.4–10.4)
POCT GLUCOSE: 130 MG/DL (ref 70–110)
POCT GLUCOSE: 142 MG/DL (ref 70–110)
POCT GLUCOSE: 167 MG/DL (ref 70–110)
POTASSIUM SERPL-SCNC: 4 MMOL/L (ref 3.5–5.1)
PROT SERPL-MCNC: 4.7 GM/DL (ref 5.8–7.6)
RBC # BLD AUTO: 2.99 X10(6)/MCL (ref 4.7–6.1)
SODIUM SERPL-SCNC: 143 MMOL/L (ref 136–145)
WBC # SPEC AUTO: 12.9 X10(3)/MCL (ref 4.5–11.5)

## 2023-03-12 PROCEDURE — 27000221 HC OXYGEN, UP TO 24 HOURS

## 2023-03-12 PROCEDURE — 25000242 PHARM REV CODE 250 ALT 637 W/ HCPCS: Performed by: INTERNAL MEDICINE

## 2023-03-12 PROCEDURE — 25000003 PHARM REV CODE 250: Performed by: INTERNAL MEDICINE

## 2023-03-12 PROCEDURE — 63600175 PHARM REV CODE 636 W HCPCS: Performed by: INTERNAL MEDICINE

## 2023-03-12 PROCEDURE — 85025 COMPLETE CBC W/AUTO DIFF WBC: CPT | Performed by: INTERNAL MEDICINE

## 2023-03-12 PROCEDURE — 94640 AIRWAY INHALATION TREATMENT: CPT

## 2023-03-12 PROCEDURE — 80053 COMPREHEN METABOLIC PANEL: CPT | Performed by: INTERNAL MEDICINE

## 2023-03-12 PROCEDURE — 99900031 HC PATIENT EDUCATION (STAT)

## 2023-03-12 PROCEDURE — 21400001 HC TELEMETRY ROOM

## 2023-03-12 PROCEDURE — 94761 N-INVAS EAR/PLS OXIMETRY MLT: CPT

## 2023-03-12 RX ORDER — FUROSEMIDE 10 MG/ML
20 INJECTION INTRAMUSCULAR; INTRAVENOUS ONCE
Status: COMPLETED | OUTPATIENT
Start: 2023-03-12 | End: 2023-03-12

## 2023-03-12 RX ORDER — IPRATROPIUM BROMIDE AND ALBUTEROL SULFATE 2.5; .5 MG/3ML; MG/3ML
3 SOLUTION RESPIRATORY (INHALATION) EVERY 6 HOURS
Status: DISCONTINUED | OUTPATIENT
Start: 2023-03-12 | End: 2023-03-14 | Stop reason: HOSPADM

## 2023-03-12 RX ADMIN — FUROSEMIDE 20 MG: 10 INJECTION, SOLUTION INTRAMUSCULAR; INTRAVENOUS at 10:03

## 2023-03-12 RX ADMIN — METHYLPREDNISOLONE SODIUM SUCCINATE 40 MG: 40 INJECTION, POWDER, FOR SOLUTION INTRAMUSCULAR; INTRAVENOUS at 04:03

## 2023-03-12 RX ADMIN — IPRATROPIUM BROMIDE AND ALBUTEROL SULFATE 3 ML: 2.5; .5 SOLUTION RESPIRATORY (INHALATION) at 12:03

## 2023-03-12 RX ADMIN — IPRATROPIUM BROMIDE AND ALBUTEROL SULFATE 3 ML: 2.5; .5 SOLUTION RESPIRATORY (INHALATION) at 08:03

## 2023-03-12 RX ADMIN — CEFEPIME 1 G: 1 INJECTION, POWDER, FOR SOLUTION INTRAMUSCULAR; INTRAVENOUS at 04:03

## 2023-03-12 RX ADMIN — UMECLIDINIUM BROMIDE AND VILANTEROL TRIFENATATE 1 PUFF: 62.5; 25 POWDER RESPIRATORY (INHALATION) at 08:03

## 2023-03-12 RX ADMIN — METHYLPREDNISOLONE SODIUM SUCCINATE 40 MG: 40 INJECTION, POWDER, FOR SOLUTION INTRAMUSCULAR; INTRAVENOUS at 08:03

## 2023-03-12 RX ADMIN — METHYLPREDNISOLONE SODIUM SUCCINATE 40 MG: 40 INJECTION, POWDER, FOR SOLUTION INTRAMUSCULAR; INTRAVENOUS at 10:03

## 2023-03-12 RX ADMIN — FLUTICASONE FUROATE 1 PUFF: 100 POWDER RESPIRATORY (INHALATION) at 08:03

## 2023-03-12 RX ADMIN — ENOXAPARIN SODIUM 30 MG: 30 INJECTION SUBCUTANEOUS at 04:03

## 2023-03-13 LAB
ALBUMIN SERPL-MCNC: 2.2 G/DL (ref 3.4–4.8)
ALBUMIN/GLOB SERPL: 0.6 RATIO (ref 1.1–2)
ALP SERPL-CCNC: 69 UNIT/L (ref 40–150)
ALT SERPL-CCNC: 14 UNIT/L (ref 0–55)
AST SERPL-CCNC: 13 UNIT/L (ref 5–34)
BASOPHILS # BLD AUTO: 0.03 X10(3)/MCL (ref 0–0.2)
BASOPHILS NFR BLD AUTO: 0.2 %
BILIRUBIN DIRECT+TOT PNL SERPL-MCNC: 0.5 MG/DL
BUN SERPL-MCNC: 24.3 MG/DL (ref 8.4–25.7)
CALCIUM SERPL-MCNC: 9.4 MG/DL (ref 8.8–10)
CHLORIDE SERPL-SCNC: 107 MMOL/L (ref 98–107)
CO2 SERPL-SCNC: 27 MMOL/L (ref 23–31)
CREAT SERPL-MCNC: 1.56 MG/DL (ref 0.73–1.18)
EOSINOPHIL # BLD AUTO: 0 X10(3)/MCL (ref 0–0.9)
EOSINOPHIL NFR BLD AUTO: 0 %
ERYTHROCYTE [DISTWIDTH] IN BLOOD BY AUTOMATED COUNT: 15.7 % (ref 11.5–17)
GFR SERPLBLD CREATININE-BSD FMLA CKD-EPI: 43 MLS/MIN/1.73/M2
GLOBULIN SER-MCNC: 3.7 GM/DL (ref 2.4–3.5)
GLUCOSE SERPL-MCNC: 166 MG/DL (ref 82–115)
HCT VFR BLD AUTO: 32.3 % (ref 42–52)
HGB BLD-MCNC: 10.1 G/DL (ref 14–18)
IMM GRANULOCYTES # BLD AUTO: 0.14 X10(3)/MCL (ref 0–0.04)
IMM GRANULOCYTES NFR BLD AUTO: 0.8 %
LYMPHOCYTES # BLD AUTO: 0.8 X10(3)/MCL (ref 0.6–4.6)
LYMPHOCYTES NFR BLD AUTO: 4.6 %
MCH RBC QN AUTO: 28 PG
MCHC RBC AUTO-ENTMCNC: 31.3 G/DL (ref 33–36)
MCV RBC AUTO: 89.5 FL (ref 80–94)
MONOCYTES # BLD AUTO: 0.16 X10(3)/MCL (ref 0.1–1.3)
MONOCYTES NFR BLD AUTO: 0.9 %
NEUTROPHILS # BLD AUTO: 16.15 X10(3)/MCL (ref 2.1–9.2)
NEUTROPHILS NFR BLD AUTO: 93.5 %
NRBC BLD AUTO-RTO: 0 %
PLATELET # BLD AUTO: 308 X10(3)/MCL (ref 130–400)
PMV BLD AUTO: 10.4 FL (ref 7.4–10.4)
POCT GLUCOSE: 121 MG/DL (ref 70–110)
POCT GLUCOSE: 151 MG/DL (ref 70–110)
POCT GLUCOSE: 171 MG/DL (ref 70–110)
POCT GLUCOSE: 185 MG/DL (ref 70–110)
POTASSIUM SERPL-SCNC: 3.9 MMOL/L (ref 3.5–5.1)
PROT SERPL-MCNC: 5.9 GM/DL (ref 5.8–7.6)
RBC # BLD AUTO: 3.61 X10(6)/MCL (ref 4.7–6.1)
SODIUM SERPL-SCNC: 143 MMOL/L (ref 136–145)
WBC # SPEC AUTO: 17.3 X10(3)/MCL (ref 4.5–11.5)

## 2023-03-13 PROCEDURE — 21400001 HC TELEMETRY ROOM

## 2023-03-13 PROCEDURE — 27000221 HC OXYGEN, UP TO 24 HOURS

## 2023-03-13 PROCEDURE — 63600175 PHARM REV CODE 636 W HCPCS: Performed by: INTERNAL MEDICINE

## 2023-03-13 PROCEDURE — 25000003 PHARM REV CODE 250: Performed by: INTERNAL MEDICINE

## 2023-03-13 PROCEDURE — 92611 MOTION FLUOROSCOPY/SWALLOW: CPT

## 2023-03-13 PROCEDURE — 25500020 PHARM REV CODE 255: Performed by: INTERNAL MEDICINE

## 2023-03-13 PROCEDURE — 80053 COMPREHEN METABOLIC PANEL: CPT | Performed by: INTERNAL MEDICINE

## 2023-03-13 PROCEDURE — 92610 EVALUATE SWALLOWING FUNCTION: CPT

## 2023-03-13 PROCEDURE — 85025 COMPLETE CBC W/AUTO DIFF WBC: CPT | Performed by: INTERNAL MEDICINE

## 2023-03-13 PROCEDURE — 97530 THERAPEUTIC ACTIVITIES: CPT | Mod: CQ

## 2023-03-13 PROCEDURE — 94640 AIRWAY INHALATION TREATMENT: CPT

## 2023-03-13 PROCEDURE — 25000242 PHARM REV CODE 250 ALT 637 W/ HCPCS: Performed by: INTERNAL MEDICINE

## 2023-03-13 PROCEDURE — 94761 N-INVAS EAR/PLS OXIMETRY MLT: CPT

## 2023-03-13 PROCEDURE — 97535 SELF CARE MNGMENT TRAINING: CPT | Mod: CO

## 2023-03-13 PROCEDURE — 63600175 PHARM REV CODE 636 W HCPCS: Performed by: NURSE PRACTITIONER

## 2023-03-13 PROCEDURE — 99900035 HC TECH TIME PER 15 MIN (STAT)

## 2023-03-13 PROCEDURE — A9698 NON-RAD CONTRAST MATERIALNOC: HCPCS | Performed by: INTERNAL MEDICINE

## 2023-03-13 RX ORDER — FUROSEMIDE 10 MG/ML
20 INJECTION INTRAMUSCULAR; INTRAVENOUS ONCE
Status: COMPLETED | OUTPATIENT
Start: 2023-03-13 | End: 2023-03-13

## 2023-03-13 RX ORDER — ASPIRIN 81 MG/1
81 TABLET ORAL DAILY
Status: DISCONTINUED | OUTPATIENT
Start: 2023-03-13 | End: 2023-03-14 | Stop reason: HOSPADM

## 2023-03-13 RX ORDER — MUPIROCIN 20 MG/G
OINTMENT TOPICAL 2 TIMES DAILY
Status: DISCONTINUED | OUTPATIENT
Start: 2023-03-13 | End: 2023-03-14 | Stop reason: HOSPADM

## 2023-03-13 RX ORDER — HYDRALAZINE HYDROCHLORIDE 20 MG/ML
10 INJECTION INTRAMUSCULAR; INTRAVENOUS EVERY 4 HOURS PRN
Status: DISCONTINUED | OUTPATIENT
Start: 2023-03-13 | End: 2023-03-14 | Stop reason: HOSPADM

## 2023-03-13 RX ADMIN — TAMSULOSIN HYDROCHLORIDE 0.4 MG: 0.4 CAPSULE ORAL at 12:03

## 2023-03-13 RX ADMIN — FERROUS SULFATE TAB 325 MG (65 MG ELEMENTAL FE) 1 EACH: 325 (65 FE) TAB at 12:03

## 2023-03-13 RX ADMIN — MUPIROCIN: 20 OINTMENT TOPICAL at 09:03

## 2023-03-13 RX ADMIN — FINASTERIDE 5 MG: 5 TABLET, FILM COATED ORAL at 12:03

## 2023-03-13 RX ADMIN — CEFEPIME 1 G: 1 INJECTION, POWDER, FOR SOLUTION INTRAMUSCULAR; INTRAVENOUS at 04:03

## 2023-03-13 RX ADMIN — IPRATROPIUM BROMIDE AND ALBUTEROL SULFATE 3 ML: 2.5; .5 SOLUTION RESPIRATORY (INHALATION) at 08:03

## 2023-03-13 RX ADMIN — CARVEDILOL 3.12 MG: 3.12 TABLET, FILM COATED ORAL at 12:03

## 2023-03-13 RX ADMIN — IPRATROPIUM BROMIDE AND ALBUTEROL SULFATE 3 ML: 2.5; .5 SOLUTION RESPIRATORY (INHALATION) at 12:03

## 2023-03-13 RX ADMIN — HYDRALAZINE HYDROCHLORIDE 10 MG: 20 INJECTION INTRAMUSCULAR; INTRAVENOUS at 01:03

## 2023-03-13 RX ADMIN — PANTOPRAZOLE SODIUM 40 MG: 40 TABLET, DELAYED RELEASE ORAL at 12:03

## 2023-03-13 RX ADMIN — BARIUM SULFATE 10 ML: 0.81 POWDER, FOR SUSPENSION ORAL at 10:03

## 2023-03-13 RX ADMIN — Medication 6 MG: at 09:03

## 2023-03-13 RX ADMIN — IPRATROPIUM BROMIDE AND ALBUTEROL SULFATE 3 ML: 2.5; .5 SOLUTION RESPIRATORY (INHALATION) at 06:03

## 2023-03-13 RX ADMIN — ENOXAPARIN SODIUM 30 MG: 30 INJECTION SUBCUTANEOUS at 04:03

## 2023-03-13 RX ADMIN — AMLODIPINE BESYLATE 5 MG: 5 TABLET ORAL at 12:03

## 2023-03-13 RX ADMIN — ATORVASTATIN CALCIUM 40 MG: 40 TABLET, FILM COATED ORAL at 09:03

## 2023-03-13 RX ADMIN — METHYLPREDNISOLONE SODIUM SUCCINATE 40 MG: 40 INJECTION, POWDER, FOR SOLUTION INTRAMUSCULAR; INTRAVENOUS at 09:03

## 2023-03-13 RX ADMIN — ASPIRIN 81 MG: 81 TABLET, COATED ORAL at 04:03

## 2023-03-13 RX ADMIN — FERROUS SULFATE TAB 325 MG (65 MG ELEMENTAL FE) 1 EACH: 325 (65 FE) TAB at 09:03

## 2023-03-13 RX ADMIN — IPRATROPIUM BROMIDE AND ALBUTEROL SULFATE 3 ML: 2.5; .5 SOLUTION RESPIRATORY (INHALATION) at 01:03

## 2023-03-13 RX ADMIN — THERA TABS 1 TABLET: TAB at 12:03

## 2023-03-13 RX ADMIN — FUROSEMIDE 20 MG: 10 INJECTION, SOLUTION INTRAMUSCULAR; INTRAVENOUS at 09:03

## 2023-03-13 NOTE — PT/OT/SLP PROGRESS
Occupational Therapy  Treatment    Prasanth Garcia   MRN: [id number]   Admitting Diagnosis: Acute kidney injury superimposed on chronic kidney disease    OT Date of Treatment: 03/13/23   OT Start Time: 1410  OT Stop Time: 1440  OT Total Time (min): 30 min     Billable Minutes:  Self Care/Home Management 30  Total Minutes: 30     OT/GERRY: GERRY     Number of GERRY visits since last OT visit: 3    General Precautions: Standard, fall  Orthopedic Precautions:    Braces:      Spiritual, Cultural Beliefs, Nondenominational Practices, Values that Affect Care: no    Subjective:  Communicated with RN prior to session.  Pt in good spirits and agreeable to OT session.      Objective:  Patient found with: telemetry, pulse ox (continuous), oxygen, becerra catheter, peripheral IV    Functional Mobility:  Bed Mobility:   Supine to sit: Minimal Assistance   Sit to supine: Minimal Assistance   Rolling: Activity did not occur   Scooting: Minimal Assistance    Feeding:  Pt requesting to eat lunch upon entry to room. Min A provided for overall feeding task 2/2 decreased strength and ROM in RUE. Demo'd use of RUE for stabilization and LUE for excursion to mouth using utensils for increased independence with feeding tasks. Good carryover noted.     Balance:   Static Sit: NORMAL: No deviations seen in posture held statically  Dynamic Sit:  GOOD+: Maintains balance through MAXIMAL excursions of active trunk motion  Static Stand: POOR: Needs MODERATE assist to maintain  Dynamic stand: POOR: Needs MOD (moderate) assist during gait    Additional Treatment:      Patient left HOB elevated with all lines intact, call button in reach, and bed alarm on    ASSESSMENT:  Prasanth Garcia is a 87 y.o. male with a medical diagnosis of Acute kidney injury superimposed on chronic kidney disease. Tolerated session well overall. Pt O2 83%-98% during session on 4L but pt denied any SOB. Required rest breaks and instructions on pursed lip breathing for recovery.  Pt confused,  provided education on use of call button for needs. Fair understanding noted.     Rehab potential is excellent    Activity tolerance: Good    Discharge recommendations: nursing facility, skilled     Equipment recommendations:       GOALS:   Multidisciplinary Problems       Occupational Therapy Goals          Problem: Occupational Therapy    Goal Priority Disciplines Outcome Interventions   Occupational Therapy Goal     OT, PT/OT Ongoing, Progressing    Description: Goals to be met by: 3/22/2023     Patient will increase functional independence with ADLs by performing:    Grooming while seated at sink with Modified [place].  Toileting from bedside commode with Moderate Assistance for hygiene and clothing management.   Toilet transfer to bedside commode with Minimal Assistance.                         Plan:  Patient to be seen 3 x/week, 5 x/week to address the above listed problems via self-care/home management, therapeutic activities, therapeutic exercises  Plan of Care expires: 03/22/23  Plan of Care reviewed with: patient         03/13/2023

## 2023-03-13 NOTE — PLAN OF CARE
Problem: Adult Inpatient Plan of Care  Goal: Plan of Care Review  Outcome: Ongoing, Progressing  Goal: Patient-Specific Goal (Individualized)  Outcome: Ongoing, Progressing  Goal: Absence of Hospital-Acquired Illness or Injury  Outcome: Ongoing, Progressing  Intervention: Identify and Manage Fall Risk  Flowsheets (Taken 3/13/2023 1608)  Safety Promotion/Fall Prevention:   assistive device/personal item within reach   bed alarm set   medications reviewed   high risk medications identified  Intervention: Prevent Skin Injury  Flowsheets (Taken 3/13/2023 1608)  Body Position: position changed independently  Skin Protection:   adhesive use limited   tubing/devices free from skin contact  Goal: Optimal Comfort and Wellbeing  Outcome: Ongoing, Progressing  Intervention: Monitor Pain and Promote Comfort  Flowsheets (Taken 3/13/2023 1608)  Pain Management Interventions:   care clustered   position adjusted   medication offered   quiet environment facilitated  Intervention: Provide Person-Centered Care  Flowsheets (Taken 3/13/2023 1608)  Trust Relationship/Rapport:   care explained   questions answered   questions encouraged   reassurance provided  Goal: Readiness for Transition of Care  Outcome: Ongoing, Progressing     Problem: Diabetes Comorbidity  Goal: Blood Glucose Level Within Targeted Range  Outcome: Ongoing, Progressing  Intervention: Monitor and Manage Glycemia  Flowsheets (Taken 3/13/2023 1608)  Glycemic Management: blood glucose monitored     Problem: Fluid and Electrolyte Imbalance (Acute Kidney Injury/Impairment)  Goal: Fluid and Electrolyte Balance  Outcome: Ongoing, Progressing     Problem: Oral Intake Inadequate (Acute Kidney Injury/Impairment)  Goal: Optimal Nutrition Intake  Outcome: Ongoing, Progressing     Problem: Renal Function Impairment (Acute Kidney Injury/Impairment)  Goal: Effective Renal Function  Outcome: Ongoing, Progressing     Problem: Skin Injury Risk Increased  Goal: Skin Health and  Integrity  Outcome: Ongoing, Progressing     Problem: Fall Injury Risk  Goal: Absence of Fall and Fall-Related Injury  Outcome: Ongoing, Progressing  Intervention: Identify and Manage Contributors  Flowsheets (Taken 3/13/2023 1608)  Self-Care Promotion:   independence encouraged   BADL personal objects within reach  Medication Review/Management:   medications reviewed   high-risk medications identified  Intervention: Promote Injury-Free Environment  Flowsheets (Taken 3/13/2023 1608)  Safety Promotion/Fall Prevention:   assistive device/personal item within reach   bed alarm set   medications reviewed   high risk medications identified     Problem: Infection  Goal: Absence of Infection Signs and Symptoms  Outcome: Ongoing, Progressing

## 2023-03-13 NOTE — PT/OT/SLP PROGRESS
Physical Therapy Treatment    Patient Name:  Prasanth Garcia   MRN:  [id number]    Recommendations:     Discharge Recommendations: nursing facility, skilled  Discharge Equipment Recommendations:  (TBD by next level of care)  Barriers to discharge: None    Assessment:     Prasanth Garcia is a 87 y.o. male admitted with a medical diagnosis of Acute kidney injury superimposed on chronic kidney disease.  He presents with the following impairments/functional limitations: weakness, impaired functional mobility, impaired cognition, decreased safety awareness, impaired cardiopulmonary response to activity, impaired endurance, gait instability, impaired balance, impaired self care skills, decreased lower extremity function .    Rehab Prognosis: Good; patient would benefit from acute skilled PT services to address these deficits and reach maximum level of function.    Recent Surgery: * No surgery found *      Plan:     During this hospitalization, patient to be seen 3 x/week (3-5x/week) to address the identified rehab impairments via gait training, therapeutic activities and progress toward the following goals:    Plan of Care Expires:  04/09/23    Subjective     Chief Complaint:   Patient/Family Comments/goals:   Pain/Comfort:         Objective:     Communicated with nurse prior to session.  Patient found HOB elevated with telemetry, pulse ox (continuous), peripheral IV, oxygen, becerra catheter upon PT entry to room.     General Precautions: Standard, fall  Orthopedic Precautions: N/A  Braces: N/A  Respiratory Status: Nasal cannula, flow 4.5 L/min  Blood Pressure:   Skin Integrity:       Functional Mobility:  Bed Mobility:     Supine to Sit: minimum assistance  Sit to Supine: minimum assistance  Transfers:     Sit to Stand:  moderate assistance with rolling walker  Gait: pt performed lateral steps toward HOB x4 steps with modA with cues for step pattern and RW negotiation        Education:  Patient provided with verbal education  regarding safety awareness.  Understanding was verbalized, however additional teaching warranted.     Patient left HOB elevated with all lines intact, call button in reach, and bed alarm on..    GOALS:   Multidisciplinary Problems       Physical Therapy Goals          Problem: Physical Therapy    Goal Priority Disciplines Outcome Goal Variances Interventions   Physical Therapy Goal     PT, PT/OT Ongoing, Progressing     Description: Goals to be met by: 23     Patient will increase functional independence with mobility by performin. Supine to sit with [place]  2. Sit to supine with [place]  3. Sit to stand transfer with Minimal Assistance  4. Bed to chair transfer with Minimal Assistance using Rolling Walker  5. Gait  x 100 feet with Contact Guard Assistance using Rolling Walker.                          Time Tracking:     PT Received On: 23  PT Start Time: 1410     PT Stop Time: 1440  PT Total Time (min): 30 min     Billable Minutes: Therapeutic Activity 30    Treatment Type: Treatment  PT/PTA: PTA     Number of PTA visits since last PT visit: 2     2023

## 2023-03-13 NOTE — PT/OT/SLP EVAL
"Speech Language Pathology Department  Clinical Swallow Evaluation    Patient Name:  Prasanth Garcia   MRN:  [id number]    Recommendations:     General recommendations:  Modified Barium Swallow Study  Diet recommendations:  NPO, Liquid Diet Level: NPO   Precautions: Standard,      History:     Prasanth Garcia is a/n 87 y.o. male admitted with possible UTI.    Past Medical History:   Diagnosis Date    Diabetes mellitus     Hypertension 9/13/2018    Pulmonary asbestosis 9/14/2018     No past surgical history on file.      Home Diet: Regular and thin liquids  Current Method of Nutrition: NPO    Patient complaint: denies    Subjective     Patient awake, alert, and confused.    Patient goals: "to eat"     Spiritual/Cultural/Episcopalian Beliefs/Practices that affect care: no    Pain/Comfort: Pain Rating 1: 0/10    Respiratory Status: nasal cannula    Objective:     Oral Musculature Evaluation  Oral Musculature: WFL  Dentition: present and adequate  Secretion Management: adequate    Consistency Fed By Oral Symptoms Pharyngeal Symptoms   Thin liquid by straw SLP None Multiple swallows  Throat clear during swallow   Puree SLP None Multiple swallows   Chewable solid SLP Prolonged mastication None     Assessment:     Signs/sx of aspiration observed at the bedside. REC: MBS to further evaluate swallow function.     Goals:   Multidisciplinary Problems       SLP Goals       Not on file                  Patient Education:     Patient provided with verbal education regarding POC.  Understanding was verbalized.    Plan:     SLP Follow-Up:      Patient to be seen:      Plan of Care expires:     Plan of Care reviewed with:  patient, family      Time Tracking:     SLP Treatment Date:    3/13/23  Speech Start Time:   0800  Speech Stop Time:      0815  Speech Total Time (min):   15    Billable minutes:  Swallow and Oral Function Evaluation, 15      03/13/2023             "

## 2023-03-13 NOTE — PROGRESS NOTES
UROLOGY  PROGRESS  NOTE    Prasanth Garcia 1935  [id number]  3/13/2023    Patient with some oxygen desaturations requiring increased supplemental oxygen  Patient working with therapy at time of rounds, getting back into bed.   O2 sat about 86, improved with rest    VSS, afebrile   overnight (3450 over last 24 hrs)  UC with proteus - on cefepime  WBC 17.3 (12.9 yesterday)  H&H 10.1/32.3  BUN/Cr 24.3/1.56     Intake/Output:  I/O this shift:  In: -   Out: 300 [Urine:300]  I/O last 3 completed shifts:  In: 640 [P.O.:640]  Out: 3450 [Urine:3450]     Exam:    NAD  Card RRR  Resp unlabored  Abd soft, NTND   concentrated yellow urine draining to  bag  Extremity no C/C/E    Recent Results (from the past 24 hour(s))   POCT glucose    Collection Time: 03/12/23  8:34 PM   Result Value Ref Range    POCT Glucose 167 (H) 70 - 110 mg/dL   Comprehensive Metabolic Panel    Collection Time: 03/13/23  4:36 AM   Result Value Ref Range    Sodium Level 143 136 - 145 mmol/L    Potassium Level 3.9 3.5 - 5.1 mmol/L    Chloride 107 98 - 107 mmol/L    Carbon Dioxide 27 23 - 31 mmol/L    Glucose Level 166 (H) 82 - 115 mg/dL    Blood Urea Nitrogen 24.3 8.4 - 25.7 mg/dL    Creatinine 1.56 (H) 0.73 - 1.18 mg/dL    Calcium Level Total 9.4 8.8 - 10.0 mg/dL    Protein Total 5.9 5.8 - 7.6 gm/dL    Albumin Level 2.2 (L) 3.4 - 4.8 g/dL    Globulin 3.7 (H) 2.4 - 3.5 gm/dL    Albumin/Globulin Ratio 0.6 (L) 1.1 - 2.0 ratio    Bilirubin Total 0.5 <=1.5 mg/dL    Alkaline Phosphatase 69 40 - 150 unit/L    Alanine Aminotransferase 14 0 - 55 unit/L    Aspartate Aminotransferase 13 5 - 34 unit/L    eGFR 43 mls/min/1.73/m2   CBC with Differential    Collection Time: 03/13/23  4:36 AM   Result Value Ref Range    WBC 17.3 (H) 4.5 - 11.5 x10(3)/mcL    RBC 3.61 (L) 4.70 - 6.10 x10(6)/mcL    Hgb 10.1 (L) 14.0 - 18.0 g/dL    Hct 32.3 (L) 42.0 - 52.0 %    MCV 89.5 80.0 - 94.0 fL    MCH 28.0 pg    MCHC 31.3 (L) 33.0 - 36.0 g/dL    RDW 15.7 11.5 - 17.0 %     Platelet 308 130 - 400 x10(3)/mcL    MPV 10.4 7.4 - 10.4 fL    Neut % 93.5 %    Lymph % 4.6 %    Mono % 0.9 %    Eos % 0.0 %    Basophil % 0.2 %    Lymph # 0.80 0.6 - 4.6 x10(3)/mcL    Neut # 16.15 (H) 2.1 - 9.2 x10(3)/mcL    Mono # 0.16 0.1 - 1.3 x10(3)/mcL    Eos # 0.00 0 - 0.9 x10(3)/mcL    Baso # 0.03 0 - 0.2 x10(3)/mcL    IG# 0.14 (H) 0 - 0.04 x10(3)/mcL    IG% 0.8 %    NRBC% 0.0 %   POCT glucose    Collection Time: 03/13/23  6:44 AM   Result Value Ref Range    POCT Glucose 151 (H) 70 - 110 mg/dL   POCT glucose    Collection Time: 03/13/23 11:42 AM   Result Value Ref Range    POCT Glucose 185 (H) 70 - 110 mg/dL       Assessment:  -Left posterolateral bladder mass vs extension of known prostate cancer with partial ureteral obstruction, moderate left hydroureteronephrosis and possible rectal wall invasion  -UTI - UC with proteus - on cefepime  -CKD with KEITH - continues improving (baseline cr reportedly around 1.5)    Plan:  Given patient's worsening leukocytosis and increased supplemental O2 requirement/oxygen desaturations, not appropriate to take for diagnostic cysto and biopsy/TURBT at this time. Recommended continuing treatment of UTI. He will need cardiac clearance prior to surgery. Will discuss with Dr. Sony Ellis, [facility]

## 2023-03-13 NOTE — PROGRESS NOTES
[facility]  Hospital Medicine Progress Note        Chief Complaint: Inpatient Follow-up    HPI:   87-year-old male with significant history of advanced COPD, chronic kidney disease stage 3, PAD, carotid artery disease status post CEA, anemia of chronic disease, type 2 diabetes mellitus, HTN, prostate cancer.  Patient also has history of GI bleed secondary to AVMs. patient is a nursing home resident and was sent to the ED for further evaluation as far as abnormal outpatient lab work.  patient was afebrile, hemodynamically stable in the ED.  Lab significant for leukocytosis, worsening anemia, acute on chronic kidney disease.  UA suggestive of UTI.  Chest x-ray negative.  Patient was admitted hospitalist medicine service.  Initiated appropriate treatment for UTI and also IV fluids for KEITH on CKD.  Hemoglobin improved post transfusion.  No overt bleeding.  Renal function slowly improving on IV fluids which was continued.  CT abdomen/pelvis was ordered on 03/09 which confirmed bladder mass with concerns for local versus distant metastatic d/s.  Urology consulted.  CT thorax ordered to rule out metastatic disease in chest.  CT chest with no metastatic disease.  Awaiting urology recommendations.  Urine cultures-Gram-negative rods.  Awaiting final speciation, on ceftriaxone.  Renal function continues to improve on IV fluids-continued.  Intermittent hypoxemia most likely secondary to severe COPD/emphysema.  Urine cultures-MDR Proteus.  Antibiotics switched to cefepime based on sensitivities.  Urology recommended 3 days of antimicrobial treatment and they will plan for inpatient cystogram/TURBT.  Patient had decompensated respiratory status on 03/12 requiring up to 9 L to maintain saturation in 90s.  Concern for pulmonary vascular congestion/COPD exacerbation.  Treated with IV Lasix 1 time dose.  Kept NPO given risk of aspiration.  Initiated Solu-Medrol and bronchodilators for COPD  exacerbation.    Interval Hx:     Patient seen at bedside.  Patient weaned down to 4 L with stable saturation in mid 90s.  No acute events overnight.  Daughter-in-law is at bedside.  Patient is NPO awaiting speech evaluation.  Reports he is hungry.  No audible wheezes.  No acute events overnight.  Mentation is at baseline.    Objective/physical exam:  General: In no acute distress, afebrile  Chest: Clear to auscultation bilaterally  Heart: S1, S2, no appreciable murmur  Abdomen: Soft, nontender, BS +  MSK: Warm, no lower extremity edema, no clubbing or cyanosis  Neurologic:  Alert, awake  VITAL SIGNS: 24 HRS MIN & MAX LAST   Temp  Min: 97.5 °F (36.4 °C)  Max: 98.4 °F (36.9 °C) 97.5 °F (36.4 °C)   BP  Min: 127/39  Max: 170/70 (!) 155/52     Pulse  Min: 65  Max: 88  69   Resp  Min: 16  Max: 24 18   SpO2  Min: 80 %  Max: 99 % (!) 91 %         Recent Labs   Lab 03/13/23  0436   WBC 17.3*   RBC 3.61*   HGB 10.1*   HCT 32.3*   MCV 89.5   MCH 28.0   MCHC 31.3*   RDW 15.7      MPV 10.4         Recent Labs   Lab 03/09/23  0340 03/10/23  0656 03/13/23  0436      < > 143   K 3.9   < > 3.9   CO2 24   < > 27   BUN 28.5*   < > 24.3   CREATININE 2.76*   < > 1.56*   CALCIUM 8.5*   < > 9.4   MG 1.80  --   --    ALBUMIN 2.2*   < > 2.2*   ALKPHOS 68   < > 69   ALT 16   < > 14   AST 21   < > 13   BILITOT 0.3   < > 0.5    < > = values in this interval not displayed.          Microbiology Results (last 7 days)       Procedure Component Value Units Date/Time    Urine culture [[id number]]  (Abnormal)  (Susceptibility) Collected: 03/08/23 0002    Order Status: Completed Specimen: Urine Updated: 03/11/23 0821     Urine Culture No other significant growth      10,000 - 25,000 colonies/ml Proteus mirabilis ESBL     Comment: ESBL (Extended spectrum beta-lactamase)                Scheduled Med:   albuterol-ipratropium  3 mL Nebulization Q6H    amLODIPine  5 mg Oral Daily    atorvastatin  40 mg Oral QHS    carvediloL  3.125 mg Oral  BID    ceFEPime (MAXIPIME) IVPB  1 g Intravenous Q24H    enoxaparin  30 mg Subcutaneous Daily    ergocalciferol  50,000 Units Oral Q7 Days    ferrous sulfate  1 tablet Oral BID    finasteride  5 mg Oral Daily    umeclidinium-vilanteroL  1 puff Inhalation QHS    And    fluticasone furoate  1 puff Inhalation QHS    furosemide (LASIX) injection  20 mg Intravenous Once    methylPREDNISolone sodium succinate injection  40 mg Intravenous TID    multivitamin  1 tablet Oral Daily    mupirocin   Nasal BID    pantoprazole  40 mg Oral Daily    tamsulosin  0.4 mg Oral Daily          Assessment/Plan:      Acute hypoxemic respiratory failure secondary to below  COPD with acute exacerbation  Pulmonary vascular congestion  Acute bacterial UTI secondary to  MDR Proteus  Sepsis secondary to above  Acute on chronic normocytic anemia status post PRBC transfusion on admit-improved   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  Ureteral obstruction secondary to above  Acute on chronic kidney disease stage 3-improving  History of PVD  Carotid artery disease status post CEA   Type 2 diabetes mellitus  Essential HTN-stable   History of prostate cancer  Prophylaxis      Respiratory status is much better today  Weaned down to 4 L with saturation in mid 90s   No much wheezes   I will decrease Solu-Medrol to 40 mg b.i.d.  Continue nebulized bronchodilators   I will give 1 more dose of IV Lasix 20 mg x 1  Speech evaluated-cleared for modified diet  Chest x-ray from 03/12 with no pneumonia  D-dimer less than 1, not impressive  Urine cultures-Proteus  Only less than 25 K colonies, but still will treat given complicated UTI   Resistant to ceftriaxone, switched to cefepime on 03/11 based on culture and sensitivities  Afebrile   Leukocytosis had gotten better as of yesterday, but worse today secondary to steroids   Patient is afebrile  Hemoglobin better today -10.1  Renal function recovered with creatinine trending down to  1.56  Monitor off fluids   A CT abdomen/pelvis was ordered on 03/09 to further evaluate bladder mass  Concerning for bladder mass with local advancement/ureteral obstruction   Urology consulted   Plan was to perform diagnostic cysto/biopsy/TURBT as inpatient after completing 3 days of antibiotics.  However given tenuous respiratory status they would like to hold off on it now  I spoke with family at bedside, they are considering hospice at nursing home, final decision yet to be made   Resume aspirin since no procedures planned as of now  Urology also recommends Velázquez decompression , Velázquez placed 3/11  CT thorax with no distant metastatic d/s  Avoid nephrotoxins  Continue home meds-amlodipine,  statin, Coreg, vitamin-D, oral iron, finasteride, bronchodilators, multivitamin, ppi, tamsulosin  DVT prophylaxis-Lovenox    Critical care time-35 minutes  Critical care diagnosis-acute hypoxemic respiratory failure requiring 4 L on oxygen mask  Critical care interventions: Hands-on evaluation, review of labs/radiographs/records and discussions with patient     Labs reviewed personally.    Hemoglobin is 10.1 today   Renal function recovering with creatinine trending down to 1.56    I spoke in detail about treatment plan of care with daughter-in-law at bedside  She wanted to discuss it further with his daughter, sister, her  and make final decision   Leaning towards palliative care/hospice at nursing home     Maria Guadalupe Masters MD   03/13/2023

## 2023-03-13 NOTE — PROCEDURES
Speech Language Pathology Department  Modified Barium Swallow Study    Patient Name:  Prasanth Garcia   MRN:  [id number]    Recommendations:     General recommendations:  SLP intervention not indicated  Repeat MBS study: not warranted at this time  Diet recommendations:  Soft & Bite Sized Diet - IDDSI Level 6, Liquid Diet Level: Thin liquids - IDDSI Level 0   Swallow strategies/precautions: small bites/sips and slow rate  General precautions: Standard,      History/Reason for Referral:     Prasanth Garcia is a/n 87 y.o. male admitted possible UTI.    Past Medical History:   Diagnosis Date    Diabetes mellitus     Hypertension 9/13/2018    Pulmonary asbestosis 9/14/2018     No past surgical history on file.  A Modified Barium Swallow Study was completed to assess the efficiency of his/her swallow function, rule out aspiration and make recommendations regarding safe dietary consistencies, effective compensatory strategies, and safe eating environment.     Home Diet: Regular and thin liquids  Current Method of Nutrition: NPO    Patient complaint: denies    Subjective:     Patient awake, alert, and confused.  Spiritual/Cultural/Protestant Beliefs/Practices that affect care: no  Pain/Comfort: Pain Rating 1: 0/10  Respiratory Status: nasal cannula    Fluoroscopic Results:     Oral Musculature Evaluation:  Oral Musculature: WFL  Dentition: present and adequate  Secretion Management: adequate    Visualization  Patient was seen in the lateral view    Oral Phase:   Reduced bolus control    Pharyngeal Phase:   Swallow delay with spill to the valleculae/pyriform sinuses  Reduced hyolaryngeal excursion  Reduced airway protection  Consistency Laryngeal Penetration Aspiration   Mildly thick liquid by spoon None None   Mildly thick liquid by straw None None   Thin liquid by straw None None   Puree None None   Chewable solid None None       Assessment:     Pt presents with mild dysphagia characterized by prolonged mastication, reduced bolus  control, swallow delay with spill to valleculae, reduced base of tongue retraction, and reduced laryngeal excursion resulting in  reduced airway protection with mild pharyngeal residue which cleared with double swallow. Despite mild delay pt with adequate airway protection. No skilled SLP intervention is warranted at this time.     Goals:   Multidisciplinary Problems       SLP Goals       Not on file                  Patient Education:     Patient provided with verbal education regarding POC.  Understanding was verbalized.    Plan:     SLP Follow-Up:       Patient to be seen:      Plan of Care expires:     Plan of Care reviewed with:  patient, family     Time Tracking:     SLP Treatment Date:    3/13/23  Speech Start Time:   0930  Speech Stop Time:      0950  Speech Total Time (min):   20    Billable minutes:   Motion Fluoroscopic Evaluation, Video Recording, 20 03/13/2023

## 2023-03-14 VITALS
RESPIRATION RATE: 19 BRPM | TEMPERATURE: 98 F | WEIGHT: 184.94 LBS | HEART RATE: 70 BPM | SYSTOLIC BLOOD PRESSURE: 136 MMHG | BODY MASS INDEX: 25.05 KG/M2 | DIASTOLIC BLOOD PRESSURE: 65 MMHG | OXYGEN SATURATION: 98 % | HEIGHT: 72 IN

## 2023-03-14 LAB
ALBUMIN SERPL-MCNC: 2.2 G/DL (ref 3.4–4.8)
ALBUMIN/GLOB SERPL: 0.6 RATIO (ref 1.1–2)
ALP SERPL-CCNC: 69 UNIT/L (ref 40–150)
ALT SERPL-CCNC: 17 UNIT/L (ref 0–55)
AST SERPL-CCNC: 19 UNIT/L (ref 5–34)
BASOPHILS # BLD AUTO: 0.02 X10(3)/MCL (ref 0–0.2)
BASOPHILS NFR BLD AUTO: 0.1 %
BILIRUBIN DIRECT+TOT PNL SERPL-MCNC: 0.3 MG/DL
BUN SERPL-MCNC: 32.6 MG/DL (ref 8.4–25.7)
CALCIUM SERPL-MCNC: 9.3 MG/DL (ref 8.8–10)
CHLORIDE SERPL-SCNC: 106 MMOL/L (ref 98–107)
CO2 SERPL-SCNC: 28 MMOL/L (ref 23–31)
CREAT SERPL-MCNC: 1.86 MG/DL (ref 0.73–1.18)
EOSINOPHIL # BLD AUTO: 0.01 X10(3)/MCL (ref 0–0.9)
EOSINOPHIL NFR BLD AUTO: 0.1 %
ERYTHROCYTE [DISTWIDTH] IN BLOOD BY AUTOMATED COUNT: 16.1 % (ref 11.5–17)
GFR SERPLBLD CREATININE-BSD FMLA CKD-EPI: 35 MLS/MIN/1.73/M2
GLOBULIN SER-MCNC: 3.4 GM/DL (ref 2.4–3.5)
GLUCOSE SERPL-MCNC: 198 MG/DL (ref 82–115)
HCT VFR BLD AUTO: 28.1 % (ref 42–52)
HGB BLD-MCNC: 9 G/DL (ref 14–18)
IMM GRANULOCYTES # BLD AUTO: 0.13 X10(3)/MCL (ref 0–0.04)
IMM GRANULOCYTES NFR BLD AUTO: 0.7 %
LYMPHOCYTES # BLD AUTO: 0.89 X10(3)/MCL (ref 0.6–4.6)
LYMPHOCYTES NFR BLD AUTO: 4.7 %
MCH RBC QN AUTO: 28.4 PG
MCHC RBC AUTO-ENTMCNC: 32 G/DL (ref 33–36)
MCV RBC AUTO: 88.6 FL (ref 80–94)
MONOCYTES # BLD AUTO: 0.18 X10(3)/MCL (ref 0.1–1.3)
MONOCYTES NFR BLD AUTO: 1 %
NEUTROPHILS # BLD AUTO: 17.54 X10(3)/MCL (ref 2.1–9.2)
NEUTROPHILS NFR BLD AUTO: 93.4 %
NRBC BLD AUTO-RTO: 0 %
PLATELET # BLD AUTO: 325 X10(3)/MCL (ref 130–400)
PMV BLD AUTO: 11 FL (ref 7.4–10.4)
POCT GLUCOSE: 202 MG/DL (ref 70–110)
POCT GLUCOSE: 208 MG/DL (ref 70–110)
POTASSIUM SERPL-SCNC: 4.5 MMOL/L (ref 3.5–5.1)
PROT SERPL-MCNC: 5.6 GM/DL (ref 5.8–7.6)
RBC # BLD AUTO: 3.17 X10(6)/MCL (ref 4.7–6.1)
SODIUM SERPL-SCNC: 144 MMOL/L (ref 136–145)
WBC # SPEC AUTO: 18.8 X10(3)/MCL (ref 4.5–11.5)

## 2023-03-14 PROCEDURE — 25000242 PHARM REV CODE 250 ALT 637 W/ HCPCS: Performed by: INTERNAL MEDICINE

## 2023-03-14 PROCEDURE — 63600175 PHARM REV CODE 636 W HCPCS: Performed by: INTERNAL MEDICINE

## 2023-03-14 PROCEDURE — 97535 SELF CARE MNGMENT TRAINING: CPT | Mod: CO

## 2023-03-14 PROCEDURE — 85025 COMPLETE CBC W/AUTO DIFF WBC: CPT | Performed by: INTERNAL MEDICINE

## 2023-03-14 PROCEDURE — 80053 COMPREHEN METABOLIC PANEL: CPT | Performed by: INTERNAL MEDICINE

## 2023-03-14 PROCEDURE — 99900035 HC TECH TIME PER 15 MIN (STAT)

## 2023-03-14 PROCEDURE — 94640 AIRWAY INHALATION TREATMENT: CPT

## 2023-03-14 PROCEDURE — 97530 THERAPEUTIC ACTIVITIES: CPT | Mod: CO

## 2023-03-14 PROCEDURE — 25000003 PHARM REV CODE 250: Performed by: INTERNAL MEDICINE

## 2023-03-14 PROCEDURE — 27000221 HC OXYGEN, UP TO 24 HOURS

## 2023-03-14 PROCEDURE — 94761 N-INVAS EAR/PLS OXIMETRY MLT: CPT

## 2023-03-14 RX ORDER — PREDNISONE 20 MG/1
TABLET ORAL
Qty: 6 TABLET | Refills: 0 | Status: SHIPPED | OUTPATIENT
Start: 2023-03-14 | End: 2023-03-22

## 2023-03-14 RX ORDER — FUROSEMIDE 20 MG/1
20 TABLET ORAL DAILY PRN
Qty: 30 TABLET | Refills: 0 | Status: SHIPPED | OUTPATIENT
Start: 2023-03-14 | End: 2023-04-13

## 2023-03-14 RX ORDER — IPRATROPIUM BROMIDE AND ALBUTEROL SULFATE 2.5; .5 MG/3ML; MG/3ML
3 SOLUTION RESPIRATORY (INHALATION) EVERY 6 HOURS PRN
Qty: 75 ML | Refills: 0 | Status: SHIPPED | OUTPATIENT
Start: 2023-03-14 | End: 2024-03-13

## 2023-03-14 RX ADMIN — METHYLPREDNISOLONE SODIUM SUCCINATE 40 MG: 40 INJECTION, POWDER, FOR SOLUTION INTRAMUSCULAR; INTRAVENOUS at 10:03

## 2023-03-14 RX ADMIN — FINASTERIDE 5 MG: 5 TABLET, FILM COATED ORAL at 08:03

## 2023-03-14 RX ADMIN — IPRATROPIUM BROMIDE AND ALBUTEROL SULFATE 3 ML: 2.5; .5 SOLUTION RESPIRATORY (INHALATION) at 07:03

## 2023-03-14 RX ADMIN — FERROUS SULFATE TAB 325 MG (65 MG ELEMENTAL FE) 1 EACH: 325 (65 FE) TAB at 08:03

## 2023-03-14 RX ADMIN — PANTOPRAZOLE SODIUM 40 MG: 40 TABLET, DELAYED RELEASE ORAL at 08:03

## 2023-03-14 RX ADMIN — IPRATROPIUM BROMIDE AND ALBUTEROL SULFATE 3 ML: 2.5; .5 SOLUTION RESPIRATORY (INHALATION) at 12:03

## 2023-03-14 RX ADMIN — INSULIN ASPART 2 UNITS: 100 INJECTION, SOLUTION INTRAVENOUS; SUBCUTANEOUS at 05:03

## 2023-03-14 RX ADMIN — ASPIRIN 81 MG: 81 TABLET, COATED ORAL at 08:03

## 2023-03-14 RX ADMIN — THERA TABS 1 TABLET: TAB at 08:03

## 2023-03-14 RX ADMIN — INSULIN ASPART 2 UNITS: 100 INJECTION, SOLUTION INTRAVENOUS; SUBCUTANEOUS at 11:03

## 2023-03-14 RX ADMIN — TAMSULOSIN HYDROCHLORIDE 0.4 MG: 0.4 CAPSULE ORAL at 08:03

## 2023-03-14 RX ADMIN — MUPIROCIN: 20 OINTMENT TOPICAL at 08:03

## 2023-03-14 NOTE — PLAN OF CARE
03/14/23 1006   Final Note   Assessment Type Final Discharge Note   Anticipated Discharge Disposition Int Care Fac  (Return to [facility] with [facility].)   Hospital Resources/Appts/Education Provided Post-Acute resouces added to AVS   Post-Acute Status   Post-Acute Authorization Hospice;Placement   Post-Acute Placement Status Set-up Complete/Auth obtained   Hospice Status Referrals Sent   Discharge Delays None known at this time     Spoke to daughter-in-law (Connie) via phone conversation with [facility]: [facility]-new referral sent. He will return to [facility].Notified Harriet with SS at [facility] regarding D/C and Hospice order.[facility]

## 2023-03-14 NOTE — DISCHARGE SUMMARY
[facility]  Hospital Medicine Discharge Summary    Admit Date: 3/7/2023  Discharge Date and Time: 3/14/00449:07 AM  Admitting Physician: SOPHIA Team  Discharging Physician: Maria Guadalupe Masters MD.  Primary Care Physician: Mindy Lowe MD      Discharge Diagnoses:  Acute hypoxemic respiratory failure secondary to below  COPD with acute exacerbation  Pulmonary vascular congestion  Acute bacterial UTI secondary to  MDR Proteus  Sepsis secondary to above  Acute on chronic normocytic anemia status post PRBC transfusion on admit-improved   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  Ureteral obstruction secondary to above  Acute on chronic kidney disease stage 3-improving  History of PVD  Carotid artery disease status post CEA   Type 2 diabetes mellitus  Essential HTN-stable   History of prostate cancer    Hospital Course:   87-year-old male with significant history of advanced COPD, chronic kidney disease stage 3, PAD, carotid artery disease status post CEA, anemia of chronic disease, type 2 diabetes mellitus, HTN, prostate cancer.  Patient also has history of GI bleed secondary to AVMs. patient is a nursing home resident and was sent to the ED for further evaluation as far as abnormal outpatient lab work.  patient was afebrile, hemodynamically stable in the ED.  Lab significant for leukocytosis, worsening anemia, acute on chronic kidney disease.  UA suggestive of UTI.  Chest x-ray negative.  Patient was admitted hospitalist medicine service.  Initiated appropriate treatment for UTI and also IV fluids for KEITH on CKD.  Hemoglobin improved post transfusion.  No overt bleeding.  Renal function slowly improving on IV fluids which was continued.  CT abdomen/pelvis was ordered on 03/09 which confirmed bladder mass with concerns for local versus distant metastatic d/s.  Urology consulted.  CT thorax ordered to rule out metastatic disease in chest.  CT chest with no metastatic  disease.  Awaiting urology recommendations.  Urine cultures-Gram-negative rods.  Awaiting final speciation, on ceftriaxone.  Renal function continues to improve on IV fluids-continued.  Intermittent hypoxemia most likely secondary to severe COPD/emphysema.  Urine cultures-MDR Proteus.  Antibiotics switched to cefepime based on sensitivities.  Urology recommended 3 days of antimicrobial treatment and they will plan for inpatient cystogram/TURBT.  Patient had decompensated respiratory status on 03/12 requiring up to 9 L to maintain saturation in 90s.  Concern for pulmonary vascular congestion/COPD exacerbation.  Treated with IV Lasix 1 time dose.  Kept NPO given risk of aspiration.  Initiated Solu-Medrol and bronchodilators for COPD exacerbation.  Weaned down to 4 L as of 3/13.  Solu-Medrol dose decreased to 40 mg b.i.d. . 1 more dose of IV Lasix given on 3/13.  Evaluated by speech and cleared for modified diet.  D-dimer less than 1, not impressive.  Urine cultures with only less than 25 K colonies paid but decided to still treat given complicated UTI.  Switched to cefepime based on culture and sensitivities.  Leukocytosis worse secondary to steroids.  Hemoglobin better paid creatinine trended down as of 3/13.  Was evaluated by Urology for bladder mass.  They had plans to do inpatient cysto/biopsy and TURBT.  But decided to hold off on this procedure secondary to tenuous respiratory status.  Treatment plan of care discussed in detail with daughter-in-law at bedside paid she wanted to discuss it further with patient's daughter, sister and son   and make final decision.  They were more leaning towards palliative care/hospice at nursing home .  After further discussion with family members daughter-in-law decided on palliative care/hospice at nursing home.  She did not want anymore aggressive interventions or treatments.  family just wanted to make the patient comfortable at nursing home.  Case management was notified and  the patient was sent back to nursing home with hospice.  Comfort measures to be continued at nursing home.  Vitals:  VITAL SIGNS: 24 HRS MIN & MAX LAST   Temp  Min: 97.3 °F (36.3 °C)  Max: 97.7 °F (36.5 °C) 97.5 °F (36.4 °C)   BP  Min: 104/42  Max: 136/74 (!) 123/54     Pulse  Min: 68  Max: 85  68   Resp  Min: 18  Max: 20 18   SpO2  Min: 91 %  Max: 95 % (!) 91 %         Physical Exam:  General appearance:  No acute distress  HENT: Atraumatic   Lungs: Clear to auscultation bilaterally.   Heart: RRR,No edema  Abdomen: Soft, non tender   Extremities: warm  Neuro:  Awake, alert,  Psych/mental status: Appropriate mood and affect.      Procedures Performed: No admission procedures for hospital encounter.     Significant Diagnostic Studies: See Full reports for all details    Recent Labs   Lab 03/12/23  0559 03/13/23  0436 03/14/23  0349   WBC 12.9* 17.3* 18.8*   RBC 2.99* 3.61* 3.17*   HGB 8.3* 10.1* 9.0*   HCT 27.5* 32.3* 28.1*   MCV 92.0 89.5 88.6   MCH 27.8 28.0 28.4   MCHC 30.2* 31.3* 32.0*   RDW 15.8 15.7 16.1    308 325   MPV 10.3 10.4 11.0*       Recent Labs   Lab 03/09/23  0340 03/10/23  0656 03/12/23  0559 03/13/23  0436 03/14/23  0349      < > 143 143 144   K 3.9   < > 4.0 3.9 4.5   CO2 24   < > 27 27 28   BUN 28.5*   < > 22.7 24.3 32.6*   CREATININE 2.76*   < > 1.80* 1.56* 1.86*   CALCIUM 8.5*   < > 8.3* 9.4 9.3   MG 1.80  --   --   --   --    ALBUMIN 2.2*   < > 2.1* 2.2* 2.2*   ALKPHOS 68   < > 61 69 69   ALT 16   < > 15 14 17   AST 21   < > 18 13 19   BILITOT 0.3   < > 0.5 0.5 0.3    < > = values in this interval not displayed.        Microbiology Results (last 7 days)       Procedure Component Value Units Date/Time    Urine culture [[id number]]  (Abnormal)  (Susceptibility) Collected: 03/08/23 0002    Order Status: Completed Specimen: Urine Updated: 03/11/23 0821     Urine Culture No other significant growth      10,000 - 25,000 colonies/ml Proteus mirabilis ESBL     Comment: ESBL (Extended  spectrum beta-lactamase)                Fl Modified Barium Swallow Speech  See procedure notes from Speech Pathologist.    This procedure was auto-finalized.         Medication List        START taking these medications      albuterol-ipratropium 2.5 mg-0.5 mg/3 mL nebulizer solution  Commonly known as: DUO-NEB  Take 3 mLs by nebulization every 6 (six) hours as needed for Wheezing. Rescue     predniSONE 20 MG tablet  Commonly known as: DELTASONE  Take 1 tablet (20 mg total) by mouth once daily for 4 days, THEN 0.5 tablets (10 mg total) once daily for 4 days.  Start taking on: March 14, 2023            CHANGE how you take these medications      furosemide 20 MG tablet  Commonly known as: LASIX  Take 1 tablet (20 mg total) by mouth daily as needed (For swelling, dyspnea, weight gain more than 2 lb overnight).  What changed:   when to take this  reasons to take this     pantoprazole 40 MG tablet  Commonly known as: PROTONIX  What changed: Another medication with the same name was removed. Continue taking this medication, and follow the directions you see here.            CONTINUE taking these medications      amLODIPine 5 MG tablet  Commonly known as: NORVASC     aspirin-calcium carbonate 81 mg-300 mg calcium(777 mg) Tab     atorvastatin 80 MG tablet  Commonly known as: LIPITOR     carvediloL 3.125 MG tablet  Commonly known as: COREG     ergocalciferol 50,000 unit Cap  Commonly known as: ERGOCALCIFEROL     ferrous gluconate 324 MG tablet  Commonly known as: FERGON     finasteride 5 mg tablet  Commonly known as: PROSCAR     loratadine 10 mg tablet  Commonly known as: CLARITIN     omega 3-dha-epa-fish oil 100-160-1,000 mg Cap     tamsulosin 0.4 mg Cap  Commonly known as: FLOMAX     TRELEGY ELLIPTA 100-62.5-25 mcg Dsdv  Generic drug: fluticasone-umeclidin-vilanter            STOP taking these medications      ciprofloxacin HCl 500 MG tablet  Commonly known as: CIPRO     ketorolac 10 mg tablet  Commonly known as: TORADOL      LOKELMA 10 gram packet  Generic drug: sodium zirconium cyclosilicate     metFORMIN 500 MG tablet  Commonly known as: GLUCOPHAGE     nitrofurantoin (macrocrystal-monohydrate) 100 MG capsule  Commonly known as: MACROBID               Where to Get Your Medications        These medications were sent to [facility] [address] [place] [address] - [address] [facility]  [address] [facility] [address]      Phone: [phone]   predniSONE 20 MG tablet       You can get these medications from any pharmacy    Bring a paper prescription for each of these medications  albuterol-ipratropium 2.5 mg-0.5 mg/3 mL nebulizer solution  furosemide 20 MG tablet          Explained in detail to the patient about the discharge plan, medications, and follow-up visits. Pt understands and agrees with the treatment plan  Discharge Disposition: Home or Self Care   Discharged Condition: stable  Diet-   Dietary Orders (From admission, onward)       Start     Ordered    03/13/23 1138  Diet diabetic Dysphagia Bite-sized  Diet effective now        Question:  Diet Modifier:  Answer:  Dysphagia Bite-sized    03/13/23 1138    03/09/23 1454  Dietary nutrition supplements Boost Glucose Control Vanilla; BID  Continuous        Question Answer Comment   Select PO Supplement: Boost Glucose Control Vanilla    Frequency: BID        03/09/23 1453                   Medications Per DC med rec  Activities as tolerated   Follow-up Information       Hospice at nursing home Follow up.                           For further questions contact hospitalist office    Discharge time 33 minutes    For worsening symptoms, chest pain, shortness of breath, increased abdominal pain, high grade fever, stroke or stroke like symptoms, immediately go to the nearest Emergency Room or call 911 as soon as possible.      Maria Guadalupe Mckee M.D on 3/14/2023. at 8:07 AM.

## 2023-03-14 NOTE — PROGRESS NOTES
UROLOGY  PROGRESS  NOTE    Prasanth Garcia 1935  [id number]  3/14/2023    Patient resting in bed  No family at bedside  No complaints during rounds  Remains on 4L supplemental O2    BP labile, afebrile   overnight (550 over last 24 hrs)  UC with proteus - on cefepime  WBC 18.8 (17.3 yesterday)  H&H 9.0/28.1  BUN/Cr 32.6/1.86    Intake/Output:  No intake/output data recorded.  I/O last 3 completed shifts:  In: -   Out: 1100 [Urine:1100]     Exam:    NAD  Card RRR  Resp unlabored  Abd soft, NTND   clear yellow urine draining to  bag  Extremity no C/C/E    Recent Results (from the past 24 hour(s))   POCT glucose    Collection Time: 03/13/23 11:42 AM   Result Value Ref Range    POCT Glucose 185 (H) 70 - 110 mg/dL   POCT glucose    Collection Time: 03/13/23  5:11 PM   Result Value Ref Range    POCT Glucose 121 (H) 70 - 110 mg/dL   POCT glucose    Collection Time: 03/13/23  9:53 PM   Result Value Ref Range    POCT Glucose 171 (H) 70 - 110 mg/dL   Comprehensive Metabolic Panel    Collection Time: 03/14/23  3:49 AM   Result Value Ref Range    Sodium Level 144 136 - 145 mmol/L    Potassium Level 4.5 3.5 - 5.1 mmol/L    Chloride 106 98 - 107 mmol/L    Carbon Dioxide 28 23 - 31 mmol/L    Glucose Level 198 (H) 82 - 115 mg/dL    Blood Urea Nitrogen 32.6 (H) 8.4 - 25.7 mg/dL    Creatinine 1.86 (H) 0.73 - 1.18 mg/dL    Calcium Level Total 9.3 8.8 - 10.0 mg/dL    Protein Total 5.6 (L) 5.8 - 7.6 gm/dL    Albumin Level 2.2 (L) 3.4 - 4.8 g/dL    Globulin 3.4 2.4 - 3.5 gm/dL    Albumin/Globulin Ratio 0.6 (L) 1.1 - 2.0 ratio    Bilirubin Total 0.3 <=1.5 mg/dL    Alkaline Phosphatase 69 40 - 150 unit/L    Alanine Aminotransferase 17 0 - 55 unit/L    Aspartate Aminotransferase 19 5 - 34 unit/L    eGFR 35 mls/min/1.73/m2   CBC with Differential    Collection Time: 03/14/23  3:49 AM   Result Value Ref Range    WBC 18.8 (H) 4.5 - 11.5 x10(3)/mcL    RBC 3.17 (L) 4.70 - 6.10 x10(6)/mcL    Hgb 9.0 (L) 14.0 - 18.0 g/dL    Hct 28.1 (L)  42.0 - 52.0 %    MCV 88.6 80.0 - 94.0 fL    MCH 28.4 pg    MCHC 32.0 (L) 33.0 - 36.0 g/dL    RDW 16.1 11.5 - 17.0 %    Platelet 325 130 - 400 x10(3)/mcL    MPV 11.0 (H) 7.4 - 10.4 fL    Neut % 93.4 %    Lymph % 4.7 %    Mono % 1.0 %    Eos % 0.1 %    Basophil % 0.1 %    Lymph # 0.89 0.6 - 4.6 x10(3)/mcL    Neut # 17.54 (H) 2.1 - 9.2 x10(3)/mcL    Mono # 0.18 0.1 - 1.3 x10(3)/mcL    Eos # 0.01 0 - 0.9 x10(3)/mcL    Baso # 0.02 0 - 0.2 x10(3)/mcL    IG# 0.13 (H) 0 - 0.04 x10(3)/mcL    IG% 0.7 %    NRBC% 0.0 %   POCT glucose    Collection Time: 03/14/23  5:42 AM   Result Value Ref Range    POCT Glucose 208 (H) 70 - 110 mg/dL       Assessment:  -Left posterolateral bladder mass vs extension of known prostate cancer with partial ureteral obstruction, moderate left hydroureteronephrosis and possible rectal wall invasion  -UTI - UC with proteus - on cefepime  -CKD with KEITH - continues improving (baseline cr reportedly around 1.5)    Plan:  The family has decided to proceed with discharge back to the nursing home with Hospice. He will be discharged with indwelling becerra and this can be changed every 3-4 weeks.  Please call as needed with any questions.     Yasmeen Ellis, [facility]-BC

## 2023-03-14 NOTE — PT/OT/SLP PROGRESS
Occupational Therapy  Treatment    Prasanth Garcia   MRN: [id number]   Admitting Diagnosis: Acute kidney injury superimposed on chronic kidney disease    OT Date of Treatment: 03/14/23   OT Start Time: 1037  OT Stop Time: 1100  OT Total Time (min): 23 min     Billable Minutes:  Self Care/Home Management 10 and Therapeutic Activity 13  Total Minutes: 23     OT/GERRY: GERRY     Number of GERRY visits since last OT visit: 4    General Precautions: Standard, fall  Orthopedic Precautions:    Braces:      Spiritual, Cultural Beliefs, Sabianism Practices, Values that Affect Care: no    Subjective:  Communicated with RN prior to session.  Pt alert and agreeable to OT session.      Objective:  Patient found with: telemetry, pulse ox (continuous), oxygen, peripheral IV, becerra catheter    Functional Mobility:  Bed Mobility:   Supine to sit: Minimal Assistance   Sit to supine: Minimal Assistance   Rolling: Activity did not occur   Scooting: Minimal Assistance    Transfer Training:   Sit>stand from EOB Mod A. Pt able to take lateral, forward and backward steps from EOB with Min A and RW. Pt became anxious and requested to return to sitting. O2 dropped to 85-90% during ax, instructed in pursed lip breathing and able to recover with seated rest breaks.     LE Dressing:  Max A to don/doff socks seated EOB. Pt able to reach ankles, but had difficulty bending to touch feet or achieving figure 4 position.     Balance:   Static Sit: GOOD+: Takes MAXIMAL challenges from all directions.    Dynamic Sit:  GOOD: Maintains balance through MODERATE excursions of active trunk movement  Static Stand: POOR+: Needs MINIMAL assist to maintain  Dynamic stand: POOR+: Needs MIN (minimal ) assist during gait    Additional Treatment:      Patient left HOB elevated with all lines intact and call button in reach    ASSESSMENT:  Prasanth Garcia is a 87 y.o. male with a medical diagnosis of Acute kidney injury superimposed on chronic kidney disease. Tolerated session  well overall. Extended time needed during ax 2/2 O2 desat and fatigue.     Rehab potential is excellent    Activity tolerance: Excellent    Discharge recommendations: nursing facility, skilled     Equipment recommendations:       GOALS:   Multidisciplinary Problems       Occupational Therapy Goals          Problem: Occupational Therapy    Goal Priority Disciplines Outcome Interventions   Occupational Therapy Goal     OT, PT/OT Ongoing, Progressing    Description: Goals to be met by: 3/22/2023     Patient will increase functional independence with ADLs by performing:    Grooming while seated at sink with Modified [place].  Toileting from bedside commode with Moderate Assistance for hygiene and clothing management.   Toilet transfer to bedside commode with Minimal Assistance.                         Plan:  Patient to be seen 3 x/week, 5 x/week to address the above listed problems via self-care/home management, therapeutic activities, therapeutic exercises  Plan of Care expires: 03/22/23  Plan of Care reviewed with: patient         03/14/2023

## 2023-06-12 PROBLEM — N17.9 ACUTE KIDNEY INJURY SUPERIMPOSED ON CHRONIC KIDNEY DISEASE: Status: RESOLVED | Noted: 2023-03-08 | Resolved: 2023-06-12

## 2023-06-12 PROBLEM — N18.9 ACUTE KIDNEY INJURY SUPERIMPOSED ON CHRONIC KIDNEY DISEASE: Status: RESOLVED | Noted: 2023-03-08 | Resolved: 2023-06-12
